# Patient Record
Sex: FEMALE | Race: BLACK OR AFRICAN AMERICAN | NOT HISPANIC OR LATINO | Employment: FULL TIME
[De-identification: names, ages, dates, MRNs, and addresses within clinical notes are randomized per-mention and may not be internally consistent; named-entity substitution may affect disease eponyms.]

---

## 2017-10-03 ENCOUNTER — HOSPITAL ENCOUNTER (OUTPATIENT)
Dept: LAB | Age: 27
Setting detail: SPECIMEN
Discharge: HOME | End: 2017-10-03

## 2017-10-03 ENCOUNTER — OFFICE VISIT - HEALTHEAST (OUTPATIENT)
Dept: FAMILY MEDICINE | Facility: CLINIC | Age: 27
End: 2017-10-03

## 2017-10-03 DIAGNOSIS — Z00.00 ROUTINE GENERAL MEDICAL EXAMINATION AT A HEALTH CARE FACILITY: ICD-10-CM

## 2017-10-03 DIAGNOSIS — N89.8 VAGINAL DISCHARGE: ICD-10-CM

## 2017-10-03 DIAGNOSIS — R35.0 URINARY FREQUENCY: ICD-10-CM

## 2017-10-03 DIAGNOSIS — Z12.4 PAPANICOLAOU SMEAR FOR CERVICAL CANCER SCREENING: ICD-10-CM

## 2017-10-03 DIAGNOSIS — N91.2 AMENORRHEA: ICD-10-CM

## 2017-10-03 DIAGNOSIS — Z62.819 HISTORY OF ABUSE IN CHILDHOOD: ICD-10-CM

## 2017-10-03 DIAGNOSIS — R10.9 ABDOMINAL PAIN: ICD-10-CM

## 2017-10-03 DIAGNOSIS — R53.83 FATIGUE: ICD-10-CM

## 2017-10-03 DIAGNOSIS — M79.10 MYALGIA: ICD-10-CM

## 2017-10-03 ASSESSMENT — MIFFLIN-ST. JEOR: SCORE: 1377.35

## 2017-10-05 LAB — ANA SER QL: 0.2 U

## 2017-10-08 ENCOUNTER — HEALTH MAINTENANCE LETTER (OUTPATIENT)
Age: 27
End: 2017-10-08

## 2017-10-11 LAB

## 2017-10-12 ENCOUNTER — OFFICE VISIT - HEALTHEAST (OUTPATIENT)
Dept: FAMILY MEDICINE | Facility: CLINIC | Age: 27
End: 2017-10-12

## 2017-10-12 ENCOUNTER — HOSPITAL ENCOUNTER (OUTPATIENT)
Dept: LAB | Age: 27
Setting detail: SPECIMEN
Discharge: HOME | End: 2017-10-12

## 2017-10-12 DIAGNOSIS — M54.2 CHRONIC NECK PAIN: ICD-10-CM

## 2017-10-12 DIAGNOSIS — N63.22 BREAST LUMP ON LEFT SIDE AT 11 O'CLOCK POSITION: ICD-10-CM

## 2017-10-12 DIAGNOSIS — G89.29 CHRONIC NECK PAIN: ICD-10-CM

## 2017-10-12 DIAGNOSIS — Z23 IMMUNIZATION DUE: ICD-10-CM

## 2017-10-12 DIAGNOSIS — M79.10 MYALGIA: ICD-10-CM

## 2017-10-12 DIAGNOSIS — R35.0 URINARY FREQUENCY: ICD-10-CM

## 2017-10-12 DIAGNOSIS — N63.11 BREAST LUMP ON RIGHT SIDE AT 10 O'CLOCK POSITION: ICD-10-CM

## 2017-10-12 DIAGNOSIS — R14.0 BLOATING: ICD-10-CM

## 2017-10-12 DIAGNOSIS — N91.2 AMENORRHEA: ICD-10-CM

## 2017-10-13 ENCOUNTER — COMMUNICATION - HEALTHEAST (OUTPATIENT)
Dept: FAMILY MEDICINE | Facility: CLINIC | Age: 27
End: 2017-10-13

## 2017-10-16 ENCOUNTER — AMBULATORY - HEALTHEAST (OUTPATIENT)
Dept: FAMILY MEDICINE | Facility: CLINIC | Age: 27
End: 2017-10-16

## 2017-10-16 DIAGNOSIS — E55.9 VITAMIN D DEFICIENCY: ICD-10-CM

## 2017-10-19 ENCOUNTER — COMMUNICATION - HEALTHEAST (OUTPATIENT)
Dept: FAMILY MEDICINE | Facility: CLINIC | Age: 27
End: 2017-10-19

## 2017-10-25 ENCOUNTER — HOSPITAL ENCOUNTER (OUTPATIENT)
Dept: LAB | Age: 27
Setting detail: SPECIMEN
Discharge: HOME | End: 2017-10-25

## 2017-10-26 ENCOUNTER — COMMUNICATION - HEALTHEAST (OUTPATIENT)
Dept: FAMILY MEDICINE | Facility: CLINIC | Age: 27
End: 2017-10-26

## 2017-11-04 ENCOUNTER — COMMUNICATION - HEALTHEAST (OUTPATIENT)
Dept: FAMILY MEDICINE | Facility: CLINIC | Age: 27
End: 2017-11-04

## 2017-11-06 ENCOUNTER — HOSPITAL ENCOUNTER (OUTPATIENT)
Dept: ULTRASOUND IMAGING | Facility: CLINIC | Age: 27
Discharge: HOME | End: 2017-11-06
Attending: FAMILY MEDICINE

## 2017-11-06 DIAGNOSIS — N63.11 BREAST LUMP ON RIGHT SIDE AT 10 O'CLOCK POSITION: ICD-10-CM

## 2017-11-06 DIAGNOSIS — N63.22 BREAST LUMP ON LEFT SIDE AT 11 O'CLOCK POSITION: ICD-10-CM

## 2017-12-07 ENCOUNTER — OFFICE VISIT - HEALTHEAST (OUTPATIENT)
Dept: FAMILY MEDICINE | Facility: CLINIC | Age: 27
End: 2017-12-07

## 2017-12-07 ENCOUNTER — RECORDS - HEALTHEAST (OUTPATIENT)
Dept: ADMINISTRATIVE | Facility: OTHER | Age: 27
End: 2017-12-07

## 2017-12-07 DIAGNOSIS — R14.0 BLOATING: ICD-10-CM

## 2017-12-07 DIAGNOSIS — G47.8 SLEEP PARALYSIS: ICD-10-CM

## 2017-12-07 DIAGNOSIS — R05.9 COUGH: ICD-10-CM

## 2017-12-07 DIAGNOSIS — G47.00 INSOMNIA: ICD-10-CM

## 2018-02-16 ENCOUNTER — OFFICE VISIT - HEALTHEAST (OUTPATIENT)
Dept: FAMILY MEDICINE | Facility: CLINIC | Age: 28
End: 2018-02-16

## 2018-02-16 DIAGNOSIS — R05.3 CHRONIC COUGH: ICD-10-CM

## 2018-02-16 DIAGNOSIS — J45.909 ASTHMA: ICD-10-CM

## 2018-02-16 ASSESSMENT — MIFFLIN-ST. JEOR: SCORE: 1368

## 2018-02-21 ENCOUNTER — OFFICE VISIT - HEALTHEAST (OUTPATIENT)
Dept: MIDWIFE SERVICES | Facility: CLINIC | Age: 28
End: 2018-02-21

## 2018-02-21 DIAGNOSIS — N89.8 VAGINAL ODOR: ICD-10-CM

## 2018-02-21 DIAGNOSIS — Z83.3 FAMILY HISTORY OF DIABETES MELLITUS IN MATERNAL GRANDMOTHER: ICD-10-CM

## 2018-02-21 DIAGNOSIS — E55.9 VITAMIN D DEFICIENCY: ICD-10-CM

## 2018-02-21 DIAGNOSIS — R35.0 URINARY FREQUENCY: ICD-10-CM

## 2018-02-21 LAB
ALBUMIN UR-MCNC: NEGATIVE MG/DL
APPEARANCE UR: CLEAR
BILIRUB UR QL STRIP: NEGATIVE
CLUE CELLS: NORMAL
COLOR UR AUTO: YELLOW
GLUCOSE UR STRIP-MCNC: NEGATIVE MG/DL
HBA1C MFR BLD: 5.8 % (ref 3.5–6)
HGB UR QL STRIP: NEGATIVE
KETONES UR STRIP-MCNC: NEGATIVE MG/DL
LEUKOCYTE ESTERASE UR QL STRIP: NEGATIVE
NITRATE UR QL: NEGATIVE
PH UR STRIP: 6.5 [PH] (ref 5–8)
SP GR UR STRIP: 1.02 (ref 1–1.03)
TRICHOMONAS, WET PREP: NORMAL
UROBILINOGEN UR STRIP-ACNC: NORMAL
YEAST, WET PREP: NORMAL

## 2018-02-21 ASSESSMENT — MIFFLIN-ST. JEOR: SCORE: 1367.71

## 2018-02-22 ENCOUNTER — COMMUNICATION - HEALTHEAST (OUTPATIENT)
Dept: ADMINISTRATIVE | Facility: CLINIC | Age: 28
End: 2018-02-22

## 2018-02-22 DIAGNOSIS — R73.03 BORDERLINE DIABETES: ICD-10-CM

## 2018-02-22 LAB
C TRACH DNA SPEC QL PROBE+SIG AMP: NEGATIVE
N GONORRHOEA DNA SPEC QL NAA+PROBE: NEGATIVE

## 2018-02-23 LAB — 25(OH)D3 SERPL-MCNC: 58.3 NG/ML (ref 30–80)

## 2018-02-28 ENCOUNTER — COMMUNICATION - HEALTHEAST (OUTPATIENT)
Dept: ADMINISTRATIVE | Facility: CLINIC | Age: 28
End: 2018-02-28

## 2018-03-02 ENCOUNTER — OFFICE VISIT - HEALTHEAST (OUTPATIENT)
Dept: FAMILY MEDICINE | Facility: CLINIC | Age: 28
End: 2018-03-02

## 2018-03-02 DIAGNOSIS — N76.0 VAGINITIS: ICD-10-CM

## 2018-03-02 DIAGNOSIS — J31.0 RHINITIS: ICD-10-CM

## 2018-03-02 DIAGNOSIS — J32.9 SINUSITIS: ICD-10-CM

## 2018-03-02 DIAGNOSIS — R05.3 CHRONIC COUGH: ICD-10-CM

## 2018-03-02 LAB
CLUE CELLS: NORMAL
TRICHOMONAS, WET PREP: NORMAL
YEAST, WET PREP: NORMAL

## 2018-03-05 ENCOUNTER — OFFICE VISIT - HEALTHEAST (OUTPATIENT)
Dept: OTOLARYNGOLOGY | Facility: CLINIC | Age: 28
End: 2018-03-05

## 2018-03-05 DIAGNOSIS — J31.0 CHRONIC RHINITIS, UNSPECIFIED TYPE: ICD-10-CM

## 2018-03-06 ENCOUNTER — OFFICE VISIT - HEALTHEAST (OUTPATIENT)
Dept: FAMILY MEDICINE | Facility: CLINIC | Age: 28
End: 2018-03-06

## 2018-03-06 DIAGNOSIS — Z30.09 FAMILY PLANNING COUNSELING: ICD-10-CM

## 2018-03-20 ENCOUNTER — COMMUNICATION - HEALTHEAST (OUTPATIENT)
Dept: SCHEDULING | Facility: CLINIC | Age: 28
End: 2018-03-20

## 2018-03-29 ENCOUNTER — COMMUNICATION - HEALTHEAST (OUTPATIENT)
Dept: FAMILY MEDICINE | Facility: CLINIC | Age: 28
End: 2018-03-29

## 2018-04-29 ENCOUNTER — COMMUNICATION - HEALTHEAST (OUTPATIENT)
Dept: FAMILY MEDICINE | Facility: CLINIC | Age: 28
End: 2018-04-29

## 2018-12-05 ENCOUNTER — HOSPITAL ENCOUNTER (OUTPATIENT)
Dept: ULTRASOUND IMAGING | Facility: CLINIC | Age: 28
Discharge: HOME | End: 2018-12-05
Attending: ADVANCED PRACTICE MIDWIFE

## 2018-12-05 ENCOUNTER — OFFICE VISIT - HEALTHEAST (OUTPATIENT)
Dept: MIDWIFE SERVICES | Facility: CLINIC | Age: 28
End: 2018-12-05

## 2018-12-05 ENCOUNTER — AMBULATORY - HEALTHEAST (OUTPATIENT)
Dept: MIDWIFE SERVICES | Facility: CLINIC | Age: 28
End: 2018-12-05

## 2018-12-05 DIAGNOSIS — Z32.00 POSSIBLE PREGNANCY: ICD-10-CM

## 2018-12-05 DIAGNOSIS — R30.0 DYSURIA: ICD-10-CM

## 2018-12-05 DIAGNOSIS — N89.8 VAGINAL DISCHARGE: ICD-10-CM

## 2018-12-05 LAB
ALBUMIN UR-MCNC: NEGATIVE MG/DL
APPEARANCE UR: CLEAR
BILIRUB UR QL STRIP: NEGATIVE
CLUE CELLS: NORMAL
COLOR UR AUTO: YELLOW
GLUCOSE UR STRIP-MCNC: NEGATIVE MG/DL
HCG UR QL: POSITIVE
HGB UR QL STRIP: NEGATIVE
KETONES UR STRIP-MCNC: NEGATIVE MG/DL
LEUKOCYTE ESTERASE UR QL STRIP: NEGATIVE
NITRATE UR QL: NEGATIVE
PH UR STRIP: 7.5 [PH] (ref 5–8)
SP GR UR STRIP: 1.02 (ref 1–1.03)
TRICHOMONAS, WET PREP: NORMAL
UROBILINOGEN UR STRIP-ACNC: NORMAL
YEAST, WET PREP: NORMAL

## 2018-12-05 ASSESSMENT — MIFFLIN-ST. JEOR: SCORE: 1406.72

## 2018-12-06 LAB
C TRACH DNA SPEC QL PROBE+SIG AMP: NEGATIVE
N GONORRHOEA DNA SPEC QL NAA+PROBE: NEGATIVE

## 2018-12-17 ENCOUNTER — HOSPITAL ENCOUNTER (OUTPATIENT)
Dept: ULTRASOUND IMAGING | Facility: HOSPITAL | Age: 28
Discharge: HOME | End: 2018-12-17
Attending: ADVANCED PRACTICE MIDWIFE

## 2018-12-17 DIAGNOSIS — Z32.00 POSSIBLE PREGNANCY: ICD-10-CM

## 2018-12-18 ENCOUNTER — COMMUNICATION - HEALTHEAST (OUTPATIENT)
Dept: MIDWIFE SERVICES | Facility: CLINIC | Age: 28
End: 2018-12-18

## 2018-12-20 ENCOUNTER — OFFICE VISIT - HEALTHEAST (OUTPATIENT)
Dept: MIDWIFE SERVICES | Facility: CLINIC | Age: 28
End: 2018-12-20

## 2018-12-20 DIAGNOSIS — Z34.91 ENCOUNTER FOR SUPERVISION OF LOW-RISK PREGNANCY IN FIRST TRIMESTER: ICD-10-CM

## 2018-12-20 DIAGNOSIS — N88.8 NABOTHIAN CYST: ICD-10-CM

## 2018-12-20 DIAGNOSIS — N83.202 CYST OF LEFT OVARY: ICD-10-CM

## 2018-12-20 DIAGNOSIS — Z67.91 RH NEGATIVE STATE IN ANTEPARTUM PERIOD, FIRST TRIMESTER: ICD-10-CM

## 2018-12-20 DIAGNOSIS — O26.891 RH NEGATIVE STATE IN ANTEPARTUM PERIOD, FIRST TRIMESTER: ICD-10-CM

## 2018-12-20 DIAGNOSIS — D25.1 INTRAMURAL LEIOMYOMA OF UTERUS: ICD-10-CM

## 2018-12-20 DIAGNOSIS — O21.9 VOMITING OR NAUSEA OF PREGNANCY: ICD-10-CM

## 2018-12-20 ASSESSMENT — MIFFLIN-ST. JEOR: SCORE: 1399.47

## 2019-01-07 ENCOUNTER — PRENATAL OFFICE VISIT - HEALTHEAST (OUTPATIENT)
Dept: MIDWIFE SERVICES | Facility: CLINIC | Age: 29
End: 2019-01-07

## 2019-01-07 DIAGNOSIS — O09.891 SUPERVISION OF OTHER HIGH RISK PREGNANCIES, FIRST TRIMESTER: ICD-10-CM

## 2019-01-07 DIAGNOSIS — N63.0 BENIGN BREAST LUMPS: ICD-10-CM

## 2019-01-07 LAB
BASOPHILS # BLD AUTO: 0 THOU/UL (ref 0–0.2)
BASOPHILS NFR BLD AUTO: 0 % (ref 0–2)
EOSINOPHIL # BLD AUTO: 0.1 THOU/UL (ref 0–0.4)
EOSINOPHIL NFR BLD AUTO: 2 % (ref 0–6)
ERYTHROCYTE [DISTWIDTH] IN BLOOD BY AUTOMATED COUNT: 12.7 % (ref 11–14.5)
HCT VFR BLD AUTO: 37.1 % (ref 35–47)
HGB BLD-MCNC: 12 G/DL (ref 12–16)
LYMPHOCYTES # BLD AUTO: 1.9 THOU/UL (ref 0.8–4.4)
LYMPHOCYTES NFR BLD AUTO: 24 % (ref 20–40)
MCH RBC QN AUTO: 25.9 PG (ref 27–34)
MCHC RBC AUTO-ENTMCNC: 32.3 G/DL (ref 32–36)
MCV RBC AUTO: 80 FL (ref 80–100)
MONOCYTES # BLD AUTO: 0.5 THOU/UL (ref 0–0.9)
MONOCYTES NFR BLD AUTO: 7 % (ref 2–10)
NEUTROPHILS # BLD AUTO: 5.1 THOU/UL (ref 2–7.7)
NEUTROPHILS NFR BLD AUTO: 67 % (ref 50–70)
PLATELET # BLD AUTO: 287 THOU/UL (ref 140–440)
PMV BLD AUTO: 10.6 FL (ref 8.5–12.5)
RBC # BLD AUTO: 4.64 MILL/UL (ref 3.8–5.4)
WBC: 7.7 THOU/UL (ref 4–11)

## 2019-01-07 ASSESSMENT — MIFFLIN-ST. JEOR: SCORE: 1381.32

## 2019-01-08 LAB
ABO/RH(D): NORMAL
ABORH REPEAT: NORMAL
ANTIBODY SCREEN: NEGATIVE
BACTERIA SPEC CULT: NO GROWTH
HIV 1+2 AB+HIV1 P24 AG SERPL QL IA: NEGATIVE

## 2019-01-09 LAB
HBV SURFACE AG SERPL QL IA: NEGATIVE
HCV AB SERPL QL IA: NEGATIVE
RUBV IGG SERPL QL IA: POSITIVE
T PALLIDUM AB SER QL: NEGATIVE

## 2019-01-23 ENCOUNTER — PRENATAL OFFICE VISIT - HEALTHEAST (OUTPATIENT)
Dept: MIDWIFE SERVICES | Facility: CLINIC | Age: 29
End: 2019-01-23

## 2019-01-23 DIAGNOSIS — O21.0 MILD HYPEREMESIS GRAVIDARUM, ANTEPARTUM: ICD-10-CM

## 2019-01-23 DIAGNOSIS — O21.9 VOMITING OR NAUSEA OF PREGNANCY: ICD-10-CM

## 2019-01-23 LAB
ALBUMIN UR-MCNC: ABNORMAL MG/DL
AMORPH CRY #/AREA URNS HPF: ABNORMAL /[HPF]
APPEARANCE UR: CLEAR
BACTERIA #/AREA URNS HPF: ABNORMAL HPF
BILIRUB UR QL STRIP: NEGATIVE
COLOR UR AUTO: YELLOW
GLUCOSE UR STRIP-MCNC: NEGATIVE MG/DL
HGB UR QL STRIP: NEGATIVE
KETONES UR STRIP-MCNC: NEGATIVE MG/DL
LEUKOCYTE ESTERASE UR QL STRIP: NEGATIVE
NITRATE UR QL: NEGATIVE
PH UR STRIP: 7.5 [PH] (ref 5–8)
RBC #/AREA URNS AUTO: ABNORMAL HPF
SP GR UR STRIP: 1.02 (ref 1–1.03)
SQUAMOUS #/AREA URNS AUTO: ABNORMAL LPF
UROBILINOGEN UR STRIP-ACNC: ABNORMAL
WBC #/AREA URNS AUTO: ABNORMAL HPF

## 2019-01-24 ENCOUNTER — AMBULATORY - HEALTHEAST (OUTPATIENT)
Dept: PHARMACY | Facility: HOSPITAL | Age: 29
End: 2019-01-24

## 2019-01-25 ENCOUNTER — COMMUNICATION - HEALTHEAST (OUTPATIENT)
Dept: SCHEDULING | Facility: CLINIC | Age: 29
End: 2019-01-25

## 2019-01-25 DIAGNOSIS — R09.82 POST-NASAL DRAINAGE: ICD-10-CM

## 2019-01-29 ENCOUNTER — COMMUNICATION - HEALTHEAST (OUTPATIENT)
Dept: FAMILY MEDICINE | Facility: CLINIC | Age: 29
End: 2019-01-29

## 2019-02-26 ENCOUNTER — INFUSION - HEALTHEAST (OUTPATIENT)
Dept: INFUSION THERAPY | Facility: HOSPITAL | Age: 29
End: 2019-02-26

## 2019-02-26 ENCOUNTER — AMBULATORY - HEALTHEAST (OUTPATIENT)
Dept: MIDWIFE SERVICES | Facility: CLINIC | Age: 29
End: 2019-02-26

## 2019-02-26 DIAGNOSIS — O21.9 VOMITING OR NAUSEA OF PREGNANCY: ICD-10-CM

## 2019-02-26 DIAGNOSIS — O21.0 MILD HYPEREMESIS GRAVIDARUM, ANTEPARTUM: ICD-10-CM

## 2019-03-07 ENCOUNTER — OFFICE VISIT - HEALTHEAST (OUTPATIENT)
Dept: FAMILY MEDICINE | Facility: CLINIC | Age: 29
End: 2019-03-07

## 2019-03-07 DIAGNOSIS — R09.81 NASAL CONGESTION: ICD-10-CM

## 2019-03-07 DIAGNOSIS — O21.0 HYPEREMESIS GRAVIDARUM, ANTEPARTUM: ICD-10-CM

## 2019-03-11 ENCOUNTER — COMMUNICATION - HEALTHEAST (OUTPATIENT)
Dept: FAMILY MEDICINE | Facility: CLINIC | Age: 29
End: 2019-03-11

## 2019-03-13 ENCOUNTER — COMMUNICATION - HEALTHEAST (OUTPATIENT)
Dept: FAMILY MEDICINE | Facility: CLINIC | Age: 29
End: 2019-03-13

## 2019-03-13 DIAGNOSIS — J31.0 CHRONIC RHINITIS: ICD-10-CM

## 2019-03-20 ENCOUNTER — COMMUNICATION - HEALTHEAST (OUTPATIENT)
Dept: FAMILY MEDICINE | Facility: CLINIC | Age: 29
End: 2019-03-20

## 2019-03-29 ENCOUNTER — PRENATAL OFFICE VISIT - HEALTHEAST (OUTPATIENT)
Dept: MIDWIFE SERVICES | Facility: CLINIC | Age: 29
End: 2019-03-29

## 2019-03-29 DIAGNOSIS — O09.891 SUPERVISION OF OTHER HIGH RISK PREGNANCIES, FIRST TRIMESTER: ICD-10-CM

## 2019-03-29 DIAGNOSIS — R73.03 BORDERLINE DIABETES: ICD-10-CM

## 2019-03-29 DIAGNOSIS — O21.9 VOMITING OR NAUSEA OF PREGNANCY: ICD-10-CM

## 2019-03-29 LAB
ALBUMIN UR-MCNC: ABNORMAL MG/DL
AMPHETAMINES UR QL SCN: NORMAL
APPEARANCE UR: CLEAR
BACTERIA #/AREA URNS HPF: ABNORMAL HPF
BARBITURATES UR QL: NORMAL
BENZODIAZ UR QL: NORMAL
BILIRUB UR QL STRIP: NEGATIVE
CANNABINOIDS UR QL SCN: NORMAL
COCAINE UR QL: NORMAL
COLOR UR AUTO: YELLOW
CREAT UR-MCNC: 114.7 MG/DL
GLUCOSE UR STRIP-MCNC: NEGATIVE MG/DL
HBA1C MFR BLD: 4.9 % (ref 3.5–6)
HGB UR QL STRIP: NEGATIVE
KETONES UR STRIP-MCNC: NEGATIVE MG/DL
LEUKOCYTE ESTERASE UR QL STRIP: ABNORMAL
METHADONE UR QL SCN: NORMAL
MUCOUS THREADS #/AREA URNS LPF: ABNORMAL LPF
NITRATE UR QL: NEGATIVE
OPIATES UR QL SCN: NORMAL
OXYCODONE UR QL: NORMAL
PCP UR QL SCN: NORMAL
PH UR STRIP: 7.5 [PH] (ref 5–8)
RBC #/AREA URNS AUTO: ABNORMAL HPF
SP GR UR STRIP: 1.02 (ref 1–1.03)
SQUAMOUS #/AREA URNS AUTO: ABNORMAL LPF
UROBILINOGEN UR STRIP-ACNC: ABNORMAL
WBC #/AREA URNS AUTO: ABNORMAL HPF

## 2019-03-29 ASSESSMENT — MIFFLIN-ST. JEOR: SCORE: 1404

## 2019-03-31 ENCOUNTER — AMBULATORY - HEALTHEAST (OUTPATIENT)
Dept: OBGYN | Facility: CLINIC | Age: 29
End: 2019-03-31

## 2019-03-31 DIAGNOSIS — R82.71: ICD-10-CM

## 2019-03-31 DIAGNOSIS — O99.891: ICD-10-CM

## 2019-04-01 ENCOUNTER — COMMUNICATION - HEALTHEAST (OUTPATIENT)
Dept: FAMILY MEDICINE | Facility: CLINIC | Age: 29
End: 2019-04-01

## 2019-04-01 ENCOUNTER — AMBULATORY - HEALTHEAST (OUTPATIENT)
Dept: MIDWIFE SERVICES | Facility: CLINIC | Age: 29
End: 2019-04-01

## 2019-04-03 ENCOUNTER — HOSPITAL ENCOUNTER (OUTPATIENT)
Dept: ULTRASOUND IMAGING | Facility: CLINIC | Age: 29
Discharge: HOME | End: 2019-04-03
Attending: ADVANCED PRACTICE MIDWIFE

## 2019-04-03 DIAGNOSIS — O09.891 SUPERVISION OF OTHER HIGH RISK PREGNANCIES, FIRST TRIMESTER: ICD-10-CM

## 2019-04-04 ENCOUNTER — AMBULATORY - HEALTHEAST (OUTPATIENT)
Dept: OBGYN | Facility: CLINIC | Age: 29
End: 2019-04-04

## 2019-04-08 ENCOUNTER — COMMUNICATION - HEALTHEAST (OUTPATIENT)
Dept: MIDWIFE SERVICES | Facility: CLINIC | Age: 29
End: 2019-04-08

## 2019-04-09 ENCOUNTER — COMMUNICATION - HEALTHEAST (OUTPATIENT)
Dept: MIDWIFE SERVICES | Facility: CLINIC | Age: 29
End: 2019-04-09

## 2019-04-09 ENCOUNTER — COMMUNICATION - HEALTHEAST (OUTPATIENT)
Dept: ADMINISTRATIVE | Facility: CLINIC | Age: 29
End: 2019-04-09

## 2019-04-10 ENCOUNTER — PRENATAL OFFICE VISIT - HEALTHEAST (OUTPATIENT)
Dept: MIDWIFE SERVICES | Facility: CLINIC | Age: 29
End: 2019-04-10

## 2019-04-10 DIAGNOSIS — Z62.819 HISTORY OF ABUSE IN CHILDHOOD: ICD-10-CM

## 2019-04-10 DIAGNOSIS — O09.891 SUPERVISION OF OTHER HIGH RISK PREGNANCIES, FIRST TRIMESTER: ICD-10-CM

## 2019-04-10 DIAGNOSIS — N89.8 VAGINAL DISCHARGE: ICD-10-CM

## 2019-04-10 DIAGNOSIS — N63.0 BENIGN BREAST LUMPS: ICD-10-CM

## 2019-04-10 DIAGNOSIS — O99.891: ICD-10-CM

## 2019-04-10 DIAGNOSIS — B37.31 CANDIDIASIS OF VULVA AND VAGINA: ICD-10-CM

## 2019-04-10 DIAGNOSIS — R82.71: ICD-10-CM

## 2019-04-10 LAB
CLUE CELLS: NORMAL
TRICHOMONAS, WET PREP: NORMAL
YEAST, WET PREP: NORMAL

## 2019-04-10 ASSESSMENT — MIFFLIN-ST. JEOR: SCORE: 1426.68

## 2019-04-11 LAB — BACTERIA SPEC CULT: NO GROWTH

## 2019-04-12 ENCOUNTER — RECORDS - HEALTHEAST (OUTPATIENT)
Dept: ADMINISTRATIVE | Facility: OTHER | Age: 29
End: 2019-04-12

## 2019-04-15 ENCOUNTER — COMMUNICATION - HEALTHEAST (OUTPATIENT)
Dept: OBGYN | Facility: CLINIC | Age: 29
End: 2019-04-15

## 2019-04-17 ENCOUNTER — COMMUNICATION - HEALTHEAST (OUTPATIENT)
Dept: MIDWIFE SERVICES | Facility: CLINIC | Age: 29
End: 2019-04-17

## 2019-04-17 ENCOUNTER — TRANSCRIBE ORDERS (OUTPATIENT)
Dept: MATERNAL FETAL MEDICINE | Facility: CLINIC | Age: 29
End: 2019-04-17

## 2019-04-17 ENCOUNTER — AMBULATORY - HEALTHEAST (OUTPATIENT)
Dept: MATERNAL FETAL MEDICINE | Facility: HOSPITAL | Age: 29
End: 2019-04-17

## 2019-04-17 ENCOUNTER — RECORDS - HEALTHEAST (OUTPATIENT)
Dept: ADMINISTRATIVE | Facility: OTHER | Age: 29
End: 2019-04-17

## 2019-04-17 ENCOUNTER — PRE VISIT (OUTPATIENT)
Dept: MATERNAL FETAL MEDICINE | Facility: CLINIC | Age: 29
End: 2019-04-17

## 2019-04-17 ENCOUNTER — COMMUNICATION - HEALTHEAST (OUTPATIENT)
Dept: MATERNAL FETAL MEDICINE | Facility: HOSPITAL | Age: 29
End: 2019-04-17

## 2019-04-17 DIAGNOSIS — O26.90 PREGNANCY RELATED CONDITION, ANTEPARTUM: Primary | ICD-10-CM

## 2019-04-17 DIAGNOSIS — O26.90 PREGNANCY, ANTEPARTUM, COMPLICATIONS: ICD-10-CM

## 2019-04-17 DIAGNOSIS — J10.1 INFLUENZA A: ICD-10-CM

## 2019-04-17 DIAGNOSIS — O34.32 CERVICAL FUNNELING AFFECTING PREGNANCY IN SECOND TRIMESTER: ICD-10-CM

## 2019-04-18 ENCOUNTER — HOSPITAL ENCOUNTER (OUTPATIENT)
Dept: ULTRASOUND IMAGING | Facility: CLINIC | Age: 29
Discharge: HOME | End: 2019-04-18
Attending: ADVANCED PRACTICE MIDWIFE | Admitting: ADVANCED PRACTICE MIDWIFE
Payer: COMMERCIAL

## 2019-04-18 ENCOUNTER — OFFICE VISIT (OUTPATIENT)
Dept: MATERNAL FETAL MEDICINE | Facility: CLINIC | Age: 29
End: 2019-04-18
Attending: ADVANCED PRACTICE MIDWIFE
Payer: COMMERCIAL

## 2019-04-18 ENCOUNTER — RECORDS - HEALTHEAST (OUTPATIENT)
Dept: ADMINISTRATIVE | Facility: OTHER | Age: 29
End: 2019-04-18

## 2019-04-18 DIAGNOSIS — O26.879 SHORT CERVIX AFFECTING PREGNANCY: Primary | ICD-10-CM

## 2019-04-18 DIAGNOSIS — O26.90 PREGNANCY RELATED CONDITION, ANTEPARTUM: ICD-10-CM

## 2019-04-18 PROCEDURE — 76817 TRANSVAGINAL US OBSTETRIC: CPT

## 2019-04-18 NOTE — PROGRESS NOTES
"Please see \"Imaging\" tab under \"Chart Review\" for details of today's US.      Sunshine Patel, DO  Maternal-Fetal Medicine        "

## 2019-04-19 ENCOUNTER — AMBULATORY - HEALTHEAST (OUTPATIENT)
Dept: MIDWIFE SERVICES | Facility: CLINIC | Age: 29
End: 2019-04-19

## 2019-04-19 DIAGNOSIS — O34.32 CERVICAL FUNNELING AFFECTING PREGNANCY IN SECOND TRIMESTER: ICD-10-CM

## 2019-04-21 ENCOUNTER — RECORDS - HEALTHEAST (OUTPATIENT)
Dept: ADMINISTRATIVE | Facility: OTHER | Age: 29
End: 2019-04-21

## 2019-04-23 ENCOUNTER — AMBULATORY - HEALTHEAST (OUTPATIENT)
Dept: MIDWIFE SERVICES | Facility: CLINIC | Age: 29
End: 2019-04-23

## 2019-04-23 ENCOUNTER — HOSPITAL ENCOUNTER (OUTPATIENT)
Dept: ULTRASOUND IMAGING | Facility: CLINIC | Age: 29
Discharge: HOME | End: 2019-04-23
Attending: OBSTETRICS & GYNECOLOGY | Admitting: SOCIAL WORKER
Payer: COMMERCIAL

## 2019-04-23 ENCOUNTER — RECORDS - HEALTHEAST (OUTPATIENT)
Dept: ADMINISTRATIVE | Facility: OTHER | Age: 29
End: 2019-04-23

## 2019-04-23 ENCOUNTER — OFFICE VISIT (OUTPATIENT)
Dept: MATERNAL FETAL MEDICINE | Facility: CLINIC | Age: 29
End: 2019-04-23
Attending: OBSTETRICS & GYNECOLOGY
Payer: COMMERCIAL

## 2019-04-23 DIAGNOSIS — O34.32 CERVICAL FUNNELING AFFECTING PREGNANCY IN SECOND TRIMESTER: ICD-10-CM

## 2019-04-23 DIAGNOSIS — O26.879 SHORT CERVIX AFFECTING PREGNANCY: Primary | ICD-10-CM

## 2019-04-23 DIAGNOSIS — O26.879 SHORT CERVIX AFFECTING PREGNANCY: ICD-10-CM

## 2019-04-23 PROCEDURE — 76817 TRANSVAGINAL US OBSTETRIC: CPT

## 2019-04-23 NOTE — PROGRESS NOTES
Please see ultrasound report under imaging tab for details on ultrasound performed today.    Kenia Vincent MD  , OB/GYN  Maternal-Fetal Medicine  [email or web address]  [phone] (Academic office)  [phone] (Pager)

## 2019-04-24 ENCOUNTER — AMBULATORY - HEALTHEAST (OUTPATIENT)
Dept: MATERNAL FETAL MEDICINE | Facility: HOSPITAL | Age: 29
End: 2019-04-24

## 2019-04-24 DIAGNOSIS — O26.90 PREGNANCY, ANTEPARTUM, COMPLICATIONS: ICD-10-CM

## 2019-04-29 ENCOUNTER — OFFICE VISIT - HEALTHEAST (OUTPATIENT)
Dept: MATERNAL FETAL MEDICINE | Facility: HOSPITAL | Age: 29
End: 2019-04-29

## 2019-04-29 ENCOUNTER — RECORDS - HEALTHEAST (OUTPATIENT)
Dept: ULTRASOUND IMAGING | Facility: HOSPITAL | Age: 29
End: 2019-04-29

## 2019-04-29 ENCOUNTER — RECORDS - HEALTHEAST (OUTPATIENT)
Dept: ADMINISTRATIVE | Facility: OTHER | Age: 29
End: 2019-04-29

## 2019-04-29 DIAGNOSIS — O35.EXX0 PREGNANCY AFFECTED BY GENITOURINARY ABNORMALITY OF FETUS, SINGLE OR UNSPECIFIED FETUS: ICD-10-CM

## 2019-04-29 DIAGNOSIS — O26.90 PREGNANCY RELATED CONDITIONS, UNSPECIFIED, UNSPECIFIED TRIMESTER: ICD-10-CM

## 2019-04-30 ENCOUNTER — AMBULATORY - HEALTHEAST (OUTPATIENT)
Dept: MIDWIFE SERVICES | Facility: CLINIC | Age: 29
End: 2019-04-30

## 2019-05-07 ENCOUNTER — COMMUNICATION - HEALTHEAST (OUTPATIENT)
Dept: MIDWIFE SERVICES | Facility: CLINIC | Age: 29
End: 2019-05-07

## 2019-05-07 ENCOUNTER — OFFICE VISIT - HEALTHEAST (OUTPATIENT)
Dept: FAMILY MEDICINE | Facility: CLINIC | Age: 29
End: 2019-05-07

## 2019-05-07 DIAGNOSIS — N89.8 VAGINAL DISCHARGE: ICD-10-CM

## 2019-05-07 DIAGNOSIS — B37.31 YEAST VAGINITIS: ICD-10-CM

## 2019-05-07 LAB
CLUE CELLS: ABNORMAL
TRICHOMONAS, WET PREP: ABNORMAL
YEAST, WET PREP: ABNORMAL

## 2019-05-07 ASSESSMENT — MIFFLIN-ST. JEOR: SCORE: 1420.78

## 2019-05-08 ENCOUNTER — COMMUNICATION - HEALTHEAST (OUTPATIENT)
Dept: MIDWIFE SERVICES | Facility: CLINIC | Age: 29
End: 2019-05-08

## 2019-06-22 ENCOUNTER — RECORDS - HEALTHEAST (OUTPATIENT)
Dept: ADMINISTRATIVE | Facility: OTHER | Age: 29
End: 2019-06-22

## 2019-06-28 ENCOUNTER — COMMUNICATION - HEALTHEAST (OUTPATIENT)
Dept: ADMINISTRATIVE | Facility: CLINIC | Age: 29
End: 2019-06-28

## 2019-07-01 ENCOUNTER — PRENATAL OFFICE VISIT - HEALTHEAST (OUTPATIENT)
Dept: MIDWIFE SERVICES | Facility: CLINIC | Age: 29
End: 2019-07-01

## 2019-07-01 DIAGNOSIS — O09.30 LATE PRENATAL CARE COMPLICATING PREGNANCY, UNSPECIFIED TRIMESTER: ICD-10-CM

## 2019-07-01 DIAGNOSIS — O26.891 RH NEGATIVE STATE IN ANTEPARTUM PERIOD, FIRST TRIMESTER: ICD-10-CM

## 2019-07-01 DIAGNOSIS — O35.EXX0 PYELECTASIS OF FETUS ON PRENATAL ULTRASOUND: ICD-10-CM

## 2019-07-01 DIAGNOSIS — O09.891 SUPERVISION OF OTHER HIGH RISK PREGNANCIES, FIRST TRIMESTER: ICD-10-CM

## 2019-07-01 DIAGNOSIS — Z67.91 RH NEGATIVE STATE IN ANTEPARTUM PERIOD, FIRST TRIMESTER: ICD-10-CM

## 2019-07-01 DIAGNOSIS — O99.013 ANEMIA DURING PREGNANCY IN THIRD TRIMESTER: ICD-10-CM

## 2019-07-01 DIAGNOSIS — O09.30 LIMITED PRENATAL CARE, ANTEPARTUM: ICD-10-CM

## 2019-07-01 LAB
AMPHETAMINES UR QL SCN: NORMAL
BARBITURATES UR QL: NORMAL
BENZODIAZ UR QL: NORMAL
CANNABINOIDS UR QL SCN: NORMAL
COCAINE UR QL: NORMAL
CREAT UR-MCNC: 161.9 MG/DL
FASTING STATUS PATIENT QL REPORTED: NO
GLUCOSE 1H P 50 G GLC PO SERPL-MCNC: 93 MG/DL (ref 70–139)
HGB BLD-MCNC: 9.5 G/DL (ref 12–16)
METHADONE UR QL SCN: NORMAL
OPIATES UR QL SCN: NORMAL
OXYCODONE UR QL: NORMAL
PCP UR QL SCN: NORMAL

## 2019-07-01 ASSESSMENT — MIFFLIN-ST. JEOR: SCORE: 1449.36

## 2019-07-02 LAB
ANTIBODY SCREEN: NEGATIVE
HCV AB SERPL QL IA: NEGATIVE
T PALLIDUM AB SER QL: NEGATIVE

## 2019-07-06 ENCOUNTER — COMMUNICATION - HEALTHEAST (OUTPATIENT)
Dept: SCHEDULING | Facility: CLINIC | Age: 29
End: 2019-07-06

## 2019-07-06 ENCOUNTER — RECORDS - HEALTHEAST (OUTPATIENT)
Dept: SCHEDULING | Facility: CLINIC | Age: 29
End: 2019-07-06

## 2019-07-11 ENCOUNTER — COMMUNICATION - HEALTHEAST (OUTPATIENT)
Dept: FAMILY MEDICINE | Facility: CLINIC | Age: 29
End: 2019-07-11

## 2019-07-12 ENCOUNTER — COMMUNICATION - HEALTHEAST (OUTPATIENT)
Dept: MIDWIFE SERVICES | Facility: CLINIC | Age: 29
End: 2019-07-12

## 2019-07-15 ENCOUNTER — AMBULATORY - HEALTHEAST (OUTPATIENT)
Dept: MATERNAL FETAL MEDICINE | Facility: HOSPITAL | Age: 29
End: 2019-07-15

## 2019-07-17 ENCOUNTER — PRENATAL OFFICE VISIT - HEALTHEAST (OUTPATIENT)
Dept: MIDWIFE SERVICES | Facility: CLINIC | Age: 29
End: 2019-07-17

## 2019-07-17 DIAGNOSIS — O99.013 ANEMIA DURING PREGNANCY IN THIRD TRIMESTER: ICD-10-CM

## 2019-07-17 DIAGNOSIS — Z20.2 POSSIBLE EXPOSURE TO STD: ICD-10-CM

## 2019-07-17 DIAGNOSIS — O09.893 SUPERVISION OF OTHER HIGH RISK PREGNANCIES, THIRD TRIMESTER: ICD-10-CM

## 2019-07-17 DIAGNOSIS — O35.EXX0 PYELECTASIS OF FETUS ON PRENATAL ULTRASOUND: ICD-10-CM

## 2019-07-17 DIAGNOSIS — Z65.9 OTHER SOCIAL STRESSOR: ICD-10-CM

## 2019-07-17 LAB
CLUE CELLS: ABNORMAL
HIV 1+2 AB+HIV1 P24 AG SERPL QL IA: NEGATIVE
TRICHOMONAS, WET PREP: ABNORMAL
YEAST, WET PREP: ABNORMAL

## 2019-07-17 RX ORDER — FERROUS SULFATE 325(65) MG
1 TABLET ORAL 2 TIMES DAILY WITH MEALS
Qty: 90 TABLET | Refills: 0 | Status: SHIPPED | OUTPATIENT
Start: 2019-07-17

## 2019-07-18 ENCOUNTER — AMBULATORY - HEALTHEAST (OUTPATIENT)
Dept: MATERNAL FETAL MEDICINE | Facility: HOSPITAL | Age: 29
End: 2019-07-18

## 2019-07-18 DIAGNOSIS — O26.90 PREGNANCY, ANTEPARTUM, COMPLICATIONS: ICD-10-CM

## 2019-07-18 LAB
C TRACH DNA SPEC QL PROBE+SIG AMP: NEGATIVE
HBV SURFACE AG SERPL QL IA: NEGATIVE
HCV AB SERPL QL IA: NEGATIVE
N GONORRHOEA DNA SPEC QL NAA+PROBE: NEGATIVE
T PALLIDUM AB SER QL: NEGATIVE

## 2019-07-19 ENCOUNTER — RECORDS - HEALTHEAST (OUTPATIENT)
Dept: ADMINISTRATIVE | Facility: OTHER | Age: 29
End: 2019-07-19

## 2019-07-19 ENCOUNTER — RECORDS - HEALTHEAST (OUTPATIENT)
Dept: ULTRASOUND IMAGING | Facility: HOSPITAL | Age: 29
End: 2019-07-19

## 2019-07-19 ENCOUNTER — OFFICE VISIT - HEALTHEAST (OUTPATIENT)
Dept: MATERNAL FETAL MEDICINE | Facility: HOSPITAL | Age: 29
End: 2019-07-19

## 2019-07-19 ENCOUNTER — AMBULATORY - HEALTHEAST (OUTPATIENT)
Dept: MIDWIFE SERVICES | Facility: CLINIC | Age: 29
End: 2019-07-19

## 2019-07-19 DIAGNOSIS — O35.9XX0 FETAL ABNORMALITY AFFECTING MANAGEMENT OF MOTHER, ANTEPARTUM, SINGLE OR UNSPECIFIED FETUS: ICD-10-CM

## 2019-07-19 DIAGNOSIS — O26.90 PREGNANCY RELATED CONDITIONS, UNSPECIFIED, UNSPECIFIED TRIMESTER: ICD-10-CM

## 2019-07-19 DIAGNOSIS — O28.3 ABNORMAL FETAL ULTRASOUND: ICD-10-CM

## 2019-07-19 LAB
ALLERGIC TO PENICILLIN: NO
GP B STREP DNA SPEC QL NAA+PROBE: NEGATIVE

## 2019-07-23 ENCOUNTER — COMMUNICATION - HEALTHEAST (OUTPATIENT)
Dept: OBGYN | Facility: HOSPITAL | Age: 29
End: 2019-07-23

## 2019-07-23 ENCOUNTER — COMMUNICATION - HEALTHEAST (OUTPATIENT)
Dept: MIDWIFE SERVICES | Facility: CLINIC | Age: 29
End: 2019-07-23

## 2019-07-25 ENCOUNTER — COMMUNICATION - HEALTHEAST (OUTPATIENT)
Dept: FAMILY MEDICINE | Facility: CLINIC | Age: 29
End: 2019-07-25

## 2019-07-30 ENCOUNTER — COMMUNICATION - HEALTHEAST (OUTPATIENT)
Dept: MIDWIFE SERVICES | Facility: CLINIC | Age: 29
End: 2019-07-30

## 2019-08-22 ENCOUNTER — COMMUNICATION - HEALTHEAST (OUTPATIENT)
Dept: MIDWIFE SERVICES | Facility: CLINIC | Age: 29
End: 2019-08-22

## 2020-02-22 ENCOUNTER — COMMUNICATION - HEALTHEAST (OUTPATIENT)
Dept: FAMILY MEDICINE | Facility: CLINIC | Age: 30
End: 2020-02-22

## 2021-03-06 ENCOUNTER — OFFICE VISIT - HEALTHEAST (OUTPATIENT)
Dept: FAMILY MEDICINE | Facility: CLINIC | Age: 31
End: 2021-03-06

## 2021-03-06 DIAGNOSIS — B96.89 BV (BACTERIAL VAGINOSIS): ICD-10-CM

## 2021-03-06 DIAGNOSIS — N76.0 BV (BACTERIAL VAGINOSIS): ICD-10-CM

## 2021-03-06 DIAGNOSIS — N89.8 VAGINAL DISCHARGE: ICD-10-CM

## 2021-03-06 LAB
ALBUMIN UR-MCNC: NEGATIVE MG/DL
APPEARANCE UR: CLEAR
BACTERIA #/AREA URNS HPF: ABNORMAL HPF
BILIRUB UR QL STRIP: NEGATIVE
CLUE CELLS: ABNORMAL
COLOR UR AUTO: YELLOW
GLUCOSE UR STRIP-MCNC: NEGATIVE MG/DL
HGB UR QL STRIP: ABNORMAL
KETONES UR STRIP-MCNC: NEGATIVE MG/DL
LEUKOCYTE ESTERASE UR QL STRIP: NEGATIVE
NITRATE UR QL: NEGATIVE
PH UR STRIP: 5.5 [PH] (ref 5–8)
RBC #/AREA URNS AUTO: ABNORMAL HPF
SP GR UR STRIP: >=1.03 (ref 1–1.03)
SQUAMOUS #/AREA URNS AUTO: ABNORMAL LPF
TRICHOMONAS, WET PREP: ABNORMAL
UROBILINOGEN UR STRIP-ACNC: ABNORMAL
WBC #/AREA URNS AUTO: ABNORMAL HPF
YEAST, WET PREP: ABNORMAL

## 2021-05-27 NOTE — PATIENT INSTRUCTIONS - HE
Generally fragrance free soaps, detergents is best    Products that tend to be safe are Cetaphil, Dove unscented, Aveeno Oatmeal    Aquaphor Eucerin Ointment to seal in moisture after bathing and on a rash.    Recommendations to reduce Nausea of pregnancy:    -small frequent meals, focus on protein 80-100g/day. Avoid heavy, greasy meals  -fresh air/exercise more days out of the week than not, 30 min  -Nadia or peppermint tea, lozenges, gum  -Seabands  -vitamin B6 50mg three times a day, with 50mg of Unisom at night (unisom makes you sleepy)      It looks like you have a vaginal yeast infection   Try Clotrimazole #7 or Monistat #7, insert nightly for 7 nights. You can apply cream to the area that is irritating on your vulva as well.     Call us if your symptoms don't resolve.     Cecelia to meet you today!  CLARITZA Donnelly,WESM

## 2021-05-27 NOTE — TELEPHONE ENCOUNTER
Paperwork from the [employer] came via fax today    Need to ask patient if this is meant for when she is post partum leave or if it was meant for her nausea and vomiting during pregnancy    Left message to return call
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
## 2021-05-27 NOTE — TELEPHONE ENCOUNTER
Called Sarah again regarding need for urine culture, as UA from last visit showed leukocytes and bacteria.  Had called pt after initial result, sent MyChart note and ordered culture, but pt did not return for lab visit.  No answer today--left message stating that call is to discuss need for lab visit to recheck urine.  Also stated that I noticed she is calling regarding work leave, and we would need to discuss this as well. Will send MyChart note.

## 2021-05-27 NOTE — PROGRESS NOTES
"Sarah is here for prenatal visit on her own today.  She states she has not been in a for visits because she had been feeling sick, has been busy with work, and had been seeing other providers.  She understands she needs to come for visits regularly from this point on and agrees to do so.  She is feeling that nausea is not as bad as it was;  still vomits about twice daily, usually in the morning, but that is much improved.  Still sensitive to smells--explained that is common and often continues throughout pregnancy.   Is not eating meat, but eats mashed potatoes, vegetables, fish occasionally.  Tries to \"make herself\" drink fluids.  UA done for ketones, and also added UTox with Sarah's permission due to long lapse in care.  Does note that at some times she has jitteriness, rapid heartbeat--discussed hypoglycemia and importance of trying to eat regular, small meals containing protein.  No chest pain or shortness of breath. Has restarted taking prenatal vitamins--takes gummies.  Baby is moving, no bleeding or leaking.  States she thinks she has felt movement since about six weeks pregnant, but only feeling \"kicks\" for last 3 weeks. Discussed that this is normal timing for quickening.  Reviewed physiology of second trimester and normal sensations, including round ligament pains and occasional dizziness.  Reviewed warning signs PTL.  Does have occasional crampiness and round ligament pain.  Reviewed history:  Oma had diabetes and she was told at one time that she might be \"prediabetic:\"  Accepts offer of Hgb A1C today.  Reviewed breast ultrasound results--has been followed for benign breast masses, likely fibroadenomas, followed with yearly ultrasounds. States is due for next one \"soon\"--last ultrasound visible in chart appears to be from 11/17.  Could consider repeating--please discuss next visit.  Also discussed NIPT and carrier testing and she desires these as well--drawn today.  Ultrasound ordered, will schedule at " [facility].  Given new copy of IOB folder and info on childbirth ed options.  Reinforced need to revisit in four weeks and discussed schedule of follow up visits throughout pregnancy.

## 2021-05-27 NOTE — TELEPHONE ENCOUNTER
"Phone call to the [employer] and spoke with a person named Ericka  Informed Uma that a form was filled out and faxed back on 1-10-19  And that the patient claims 2 questions were not answered, so asked which questions needed to be answered. Uma stated that the forms were incomplete because of Part B, questions #3 and #7.    Question #3's answer was handwritten on the side. Uma stated that the numbers actually need to be filled in. For example if she is seen 1 time a month till a certain month. And to fill in the duration of the appointment in minutes and how long it would take to travel back and forth to appointments  .    Then when the appointments become more frequent will need to update the [facility] and say that she will be coming for 2 times a month till whatever month.    And then will need to update them again when it will be 1 time a week till delivery.    Question #7 is in regards to her \"flare ups\" such as nausea and vomiting. For example how many days a month to be off work for nausea and vomiting. Uma asked if it would be 4 times a month or 5 times a month, for example.    She said can fill out new form. Or can write in changes on the old form. Date and Initial questions #3 and 37 3 and  Sign and date form and send back.    Will need to call patient to see what is the frequency of her calling in to work.    "

## 2021-05-27 NOTE — TELEPHONE ENCOUNTER
Referral from Judith Campbell to [facility] for cervical funneling. Patient will have an U/S at [facility] on 4/18. Removing order.    Heidi Santo

## 2021-05-27 NOTE — TELEPHONE ENCOUNTER
Spoke to patient    Question #3. She says she lives in [place].  It takes about 48 minutes to get to her appointment at [facility].The prenatal appointment takes about 20 minutes. Then it would take her about 48 minutes to get back home.    Question #7.  Patient states she calls in about 3 times a week because of her nausea and vomiting.  The nausea comes and goes. Sometimes she'll have it in the morning. Go waya and then come back in the afternoon.  When asked what she is doing for her nausea and vomiting. She said she is taking the vit B-6 and her {NV. She states she is trying to exercise. Trying to eat well. Eats cereal bars, mashed potatoes, drinks milk.  Has post nasal drip with her pregnancy and that makes her nauseated as well.

## 2021-05-27 NOTE — TELEPHONE ENCOUNTER
Spoke with patient  Informed that spoke with representative at [employer]  They now know of her prenatal visit schedule  They were informed that she is not considered disabled and will not momo leave because of that    Patient states she has been to the ER, was told to get IV infusion, has her visits documented and wants to know why she can't have the midwives give intermitttent leave for her nausea and vomiting. Informed that Suzie Hernandez had sent her a [employer] message explaining that the midwives cannot put her on leave since she is not disabled.    She would like to speak to a midwife.

## 2021-05-27 NOTE — PATIENT INSTRUCTIONS - HE
Visit actually [facility] Nurse Midwives - Contact information:  Appointment line and to get a hold of CNM in clinic Monday-Friday 8 am - 5 pm:  [phone].  There are some clinics with early start times (1st appointment 7:40 am) and others with evening hours (last appointment 6:20 pm).  Most are typically open from 8 am to 5 pm.    CNM on call answering service: [phone].  Specify your hospital of choice and leave a brief message for CNM;  will then page CNM who is on call at your specified hospital and you should receive a call back with 15 minutes.  Be sure that your ringer is audible and that you can accept blocked calls so that we can get back in touch with you! This number should be reserved for urgent needs if during the day, before 8 am, after 5 pm, weekends, holidays.    Contact the on-call CNM with warning signs, such as:    vaginal bleeding     Vaginal discharge and itching or pain and burning during urination    Leg/calf pain or swelling on one side    severe abdominal pain    nausea and vomiting more than 4-5 times a day, or if you are unable to keep anything down    fever more than 100.4 degrees F.         Touring the Maternity Care Center  To schedule a tour of [facility], call [phone]    To schedule a tour of [facility], call [phone]         You are invited to  Meet the [facility] Nurse-Midwives  A way to tour the hospital Labor and Delivery unit and meet the midwives that attend births since you may not have the opportunity to meet them during your prenatal care.  Some sessions are informal meet and greet type social hours, others address a specific concern or topic.    Tuesday, Februrary 12, 2019 7-8pm  [facility]    Wednesday, April 17, 2019 7-8pm  [facility], Auditorium A    Thursday, August 15, 2019 7-8pm  [facility]    Wednesday November 13, 2019 7-8 pm  [facility]  [place], Auditorum A    Please call [phone] to register      Ultrasound Appointment:   Don't forget to schedule your ultrasound appointment around 20 weeks into your pregnancy. Your midwife will order the exam for you to schedule at [phone] with [facility] radiology locations or at the independent radiology clinic of your preference.      GENETIC SCREENING OPTIONS AT [facility]          All testing is optional. We don t recommend or discourage any test; it is totally up to you and your partner. Some couples wish to know their risk of having a baby with a genetic defect and others do not. We will support your decision. Abnormal results may lead to a discussion of options for further testing.    Accurate dating of your pregnancy is important for all testing so an ultrasound may be done prior to referral or testing.    No testing provides certainty; there are false positives and negatives associated with all testing, some more than others.    Most genetic testing is non-invasive (requires only a blood sample and sometimes an ultrasound or both).    It is always wise to check with your insurance carrier before proceeding.    Some testing can be done at our lab and some require a referral.    If you decide to do no testing, the 20 week ultrasound scan has some ability to detect abnormalities in the baby.    [place] or [facility]    At 10 wk or greater, a blood sample can be drawn here at clinic or at any of our referral offices. It will provide highly accurate results with low false positive rates for trisomy 18, trisomy 21 (Down Syndrome), and trisomy 13 (> 99% trisomy detection rate at a false positive rate of <0.1%). Gender identification can also be obtained if desired.    Quad Screen (4-marker screen)    Between 15 and 21 weeks, a sample of blood can be drawn at our lab to assess your risk of having a baby with Down Syndrome, Trisomy 18 and neural tube defects. Such testing is able to detect these conditions in  80-90% of cases and the false-positive rate is approximately 5%.     Why is dental care in pregnancy important?  During pregnancy, you are more likely to have problems with your teeth or gums. If you have an infection in your teeth or gums, the chance of your baby being premature (born early) or having low birth weight may be slightly higher than if your teeth and gums are healthy  Dental care is safe during pregnancy and important for the health of you and your baby.   What should you know before you see the dentist?    Make sure your dentist knows that you are pregnant.    If medications for infection or for pain are needed, your dentist can prescribe ones that are safe for you and your baby.    Tell your dentist about any changes you have noticed since you became pregnant and about any medications r or supplements you are taking.    Routine x-rays should be avoided in pregnancy, but it may be necessary if there is a problem or an emergency.     Your body should be covered with a lead apron to protect you and your baby.    Dental work can be done safely at any point in pregnancy. If possible, it is best to delay treatments and pro- cedures until after the first trimester.    For more information on dental health in pregnancy: http://onlinelibrary.kelsey.com/store/10.1111/jmwh.52141/asset/spab75388.pdf?v=1&t=olgr0g53&u=[email or web address]     Quickening:   Your Baby in the Second Trimester of Pregnancy  ? At the start of the second trimester, you will feel your baby's movements, which get stronger as the baby grows bigger. At the end of the fourth month, your baby weighs about five ounces. Her kidneys begin to produce urine. During visits to your health care provider, you will be able to hear your baby's heartbeat more clearly. Your baby can move and hear your voice.   ? By the end of the fifth month, you'll be able to feel light movements (called quickening) of your fetus. Your baby is sleeping  and waking at regular intervals, and is more active than before. At this point, she is about nine inches long and weighs about one-half to one pound. During the sixth month, your baby's features become clearer. Eyebrows, eyelashes, and hair are developing. She also has finger and toe prints, and may be kicking strongly.  ? By the end of the second trimester, your baby weighs as much as two pounds and is about 11 inches long.         Gestational diabetes  Gestational diabetes develops during pregnancy (gestation). Like other types of diabetes, gestational diabetes affects how your cells use sugar (glucose). Gestational diabetes causes high blood sugar that can affect your pregnancy and your baby's health.  Any pregnancy complication is concerning, but there's good news. Expectant moms can help control gestational diabetes by eating healthy foods, exercising and, if necessary, taking medication. Controlling blood sugar can prevent a difficult birth and keep you and your baby healthy.  In gestational diabetes, blood sugar usually returns to normal soon after delivery. But if you've had gestational diabetes, you're at risk for type 2 diabetes. You'll continue working with your health care team to monitor and manage your blood sugar.    Who is at risk?  This is a list of factors that increase the risk of developing gestational diabetes for women during pregnancy:        Overweight prior to pregnancy (20% or more over ideal body weight)        High risk ethnic group: , , ,         Impaired glucose tolerance or traces of glucose in the urine        Family history of diabetes        Previously giving birth to a baby over 9 lbs. or stillborn        Previous pregnancy with gestational diabetes    Prevention:  There are no guarantees when it comes to preventing gestational diabetes -- but the more healthy habits you can adopt before pregnancy, the better. If you've had gestational  diabetes, these healthy choices may also reduce your risk of having it in future pregnancies or developing type 2 diabetes down the road.  Eat healthy foods. Choose foods high in fiber and low in fat and calories. Focus on fruits, vegetables and whole grains. Strive for variety to help you achieve your goals without compromising taste or nutrition. Watch portion sizes.   Keep active. Exercising before and during pregnancy can help protect you from developing gestational diabetes. Aim for 30 minutes of moderate activity on most days of the week. Take a brisk daily walk. Ride your bike. Swim laps.  If you can't fit a single 30-minute workout into your day, several shorter sessions can do just as much good. Park in the distant lot when you run errands. Get off the bus one stop before you reach your destination. Every step you take increases your chances of staying healthy.  Lose excess pounds before pregnancy. Doctors don't recommend weight loss during pregnancy. But if you're planning to get pregnant, losing extra weight beforehand may help you have a healthier pregnancy.  Focus on permanent changes to your eating habits. Motivate yourself by remembering the long-term benefits of losing weight, such as a healthier heart, more energy and improved self-esteem.    Preventing Diabetes after Pregnancy:  It is estimated that 35-60 percent of women that have had gestational diabetes will develop type 2 diabetes in the future. It is also thought that children from these pregnancies have a greater chance of developing obesity and type 2 diabetes.    If you do have prediabetes or have risk factors for having diabetes, research shows that doing just two things can help you prevent or delay type 2 diabetes: Lose 5% to 7% of your body weight, which would be 10 to 14 pounds for a 200-pound person; and get at least 150 minutes each week of physical activity, such as brisk walking.    RESOURCES   You can refer to the Starting Out  Right book or find it online at http://www.[facility].org/images/stories/maternity/[facility]-Starting-Out-Right.pdf or http://www.[facility].org/images/stories/flipbooks/[facility]-starting-out-right/[facility]-starting-out-right.html#p=8    You can sign up for a weekly parenting e-mail that gives support, tips and advice from health care professionals that starts with pregnancy and continues through the toddler years. To register, go to [email or web address] at any time during your pregnancy.    Breastfeeding Information:  OUTPATIENT LACTATION RESOURCES    -Schedule a clinic appointment with a [facility] CN with dedicated clinic hours for breastfeeding assistance by calling [phone]. Breastfeeding clinic visits are at [facility] on Wednesdays, [facility] on Tuesdays and [facility] on Thursdays.     -Schedule an outpatient appointment with one of the [facility] Lactation Consultants at [facility], [facility], or [facility] by calling [phone]    -Attend a baby weigh in at [facility].  Lactation consultants are available to answer questions  [place]: Tuesdays 1:00 - 2:00  [facility], [facility]: Mondays 1:00 - 2:00   www.[employer]    -Attend one of the New Mama groups at [facility] in [facility].  [facility] also offers one-on-one in home and in office lactation consults.   www.MyCarGossip    -Attend a LeLeche League meeting.  Multiple groups in several locations throughout the [facility]. The meetings are no-cost and always informative breastfeeding education session through [employer] La Leche League  Www.lllofmndas.org/  Medication use while breastfeeding: http://toxnet.nlm.nih.gov/newtoxnet/lactmed.htm     Childbirth and Parenting Education:   [facility]: http://[employer]/   (990) 388-BABY  [place]: (education, yoga & wellness) www.[employer]ends  [facility]: www.[employer]   Childbirth collective: (Parent  topic nights)  www.childbirthcollective.org/  Hypnobabies:  www.hypnobabiestwincities.com/  Hypnobirthing:  Http://hypnobirthing.com/    Book Recommendations:   Karen Tomas's Birthing From Within--first few chapters include a new-age tone, you may prefer to skip it and keep going, because there is good stuff later.  This book recommendation covers emotional preparation, but does cover coping with pain, and use of both pharmacological and nonpharmacological methods.    Dr. Guerrero' The Pregnancy Book and The Birth Book--the pregnancy book goes month-by month    Womanly Art of Breastfeeding by La Leche League International   Bestfeeding by Mavis Guajardo--great pictures    Mothering Your Nursing Toddler, by Yvonne Vivar.   Addresses dealing with so many of the challenging behaviors of a nursing toddler.  How Weaning Happens, by La Leche League.  Discusses weaning at all ages, from medically necessary weaning of an infant, all the way up to age 5 (or older), with why/why not, and strategies.  Very empowering book both for deciding to wean and deciding not to.    American College of Nurse-Midwives (ACNM) http://www.midwife.org/; look at the informational handouts at http://www.midwife.org/Share-With-Women     www.mymidwife.org    Mother to Baby (Medication and Herbal guidance in pregnancy): http://www.mothertobaby.org  Toll-Free Hotline: 296.652.8329  LactMed (Medication use while breastfeeding): http://toxnet.nlm.nih.gov/newtoxnet/lactmed.htm    Women's Health.gov:  http://www.womenshealth.gov/a-z-topics/index.html    American pregnancy association - http://americanpregnancy.org    Centering Pregnancy (group prenatal care option): http://centeringhealthcare.org    Information about doulas:  Childbirth collective: http://www.childbirthcollective.org/  Doulas of [place] (TATIANA):  www.tatiana.org  [facility]  project: http://twincitiesdoulaproject.com/     Early Childhood and Family Education (ECFE):  ECFE  "offers parents hands-on learning experiences that will nourish a lifetime of teachable moments.  http://ecfe.info/ecfe-home/    March of Dimes www.Conferize.GoodyTag     FDA - Nutrition  www.mypyramid.gov  Under \"For Consumers,\" click on \"pregnant and breastfeeding women.\"      Centers for Disease Control and Prevention (CDC) - Vaccines : http://www.cdc.gov/vaccines/       When researching information on the web, question the validity of websites.  The [employer] .gov, .edu and.org tend to be more reliable information.  If there are a lot of advertisements, be cautious of the information provided. Stay away from blogs and chat rooms please!  "

## 2021-05-27 NOTE — TELEPHONE ENCOUNTER
TC: Called Sarah to discuss her visit to the [facility] today.   Sarah is a 27 yo  at 23/3 weeks who presented to [facility] with LLQ pain, myalgias, cough and chest pain. She was diagnosed with Influenza A and was given Tamiflu. Her urine culture did show some bacteria, urine  Culture is pending.   Sarah had some mild cramping for three hours last night from [id number]. She had an ultrasound in the ER which showed cervical funneling. They measured the cervix to be 1.5cm and 1.2cm with valsalva     Sarah was seen last week with the  Midwives and had a negative urine culture and a vaginal yeast infection. She has been using Monistat vaginal cream for the last week, tonight is her last night to use the cream.   She had a negative urine culture last week.     Sarah was advised to schedule a f/u appt with her OB provider in the next week.   She denies pelvic pain, cramping, increased vaginal pressure, LOF or bleeding. Discussed that the flu is highly contagious and advised that she stay home until she has been on the Tamiflu for at least 24 hours, or until she is feeling better. Sarah reports she has not had a fever.     Advised that she see MFM to evaluate her cervix and devise a plan going forward. Advised that she abstain from sexual activity (intercourse or breast stimulation) until we get more information.     Will place a referral for her to see MFM soon and ask that she schedule a follow up appt with us after she has seen MFM.     Reviewed strategies to minimize her flu symptoms: encouraged rest, hydration, Robitussin, Mucinex, Tylenol as needed. Encouraged her to call with any s/sx of PTL.     Reviewed this plan with Marilu Hurtado CNM, clinical director    CLARITZA Donnelly CNM

## 2021-05-27 NOTE — TELEPHONE ENCOUNTER
Task set up to fax over Innatal and Preparent results to [facility] when they are scanned into Epic.

## 2021-05-27 NOTE — TELEPHONE ENCOUNTER
Spoke with Mike from the [facility]    Question #3. Mike said that he already has the information needed to answer question #3 from a fax received late January. He said it's already been addressed.    Question #7. Informed Mike that the patient is not disabled. CNMs will not fill out question #7 to momo leave because she is not disabled  He said he will take note of that.  The midwives do not need to fill out another FMLA form since he has been verbally informed.

## 2021-05-27 NOTE — TELEPHONE ENCOUNTER
INNATAL results (per Maansa Jasso LPN):   NEGATIVE/NORMAL, XY.    Carrier screen: POSITIVE for alpha thalassemia carrier, otherwise negative.    Left message to call back in order to discuss results over the telephone.    Plan to recommend referral to [facility] for genetic counseling and to offer the same carrier screening for Letty, her partner/FOB.

## 2021-05-27 NOTE — TELEPHONE ENCOUNTER
"Returned Sarah's call.  She reports that she is requesting \"intermittent leave\" so that she can take days off from work as often as she needs them for nausea and vomiting, without putting her job at risk.  She reports that she sometimes takes up to three days off each week, because she feels nauseated at work at different times each day, and also has postnasal drip that causes her nausea.  She states that \"intermittent leave\" is different from FMLA and disability;  She is not asking that we verify that she is disabled, just that we report to the [facility] that she will require up to three days off each week because of her nausea of pregnancy, which she can use whenever it is required. \"I know I don't need to be on bedrest or anything.\"  Explained to Sarah that we cannot sign a form confirming that she requires up to three days/week off of work, which is most of her work time, especially when she has not been willing to attempt most remedies to manage nausea.  Sarah responded that she is taking Vitamin B6 and prenatal vitamins, but that she is not willing to try Zofran or Reglan because she fears they will hurt her baby.  Reviewed that risks are very low, and that this has previously been discussed with her by both Leslee Prieto CNM, and Dr. France.  Sarah became very angry, and indicated that she feels refusal to sign medical leave form is an attempt to \"force\" her into taking medications she believes are unsafe.  Assured Sarah she is under no requirement to take medications she does not want to take, but if it is her choice to do something that affects her ability to work, she may need to manage the consequences;  The midwives cannot take the responsibility of saying she is unable to work.  Sarah stated that her pregnancy is planned, she is trying, and she does not want to just not go to work, but reiterated that she feels \"forced\" to take medications by refusal to sign medical leave form, and after a " second attempt to assure her of safety, she abruptly ended the call.     Also of note:  At last prenatal visit with this CNM, Sarah was noted to have started gaining weight, had no evidence of dehydration (no ketones in urine), and stated that nausea and vomiting, while still present, had improved.

## 2021-05-27 NOTE — TELEPHONE ENCOUNTER
Name of caller: Patient  Phone number where you may be reached: [phone]  Reason for call: Pt faxed FMLA paperwork to [place] and is checking on the status to see when it will be faxed back. Please call pt when/if paperwork is faxed.  Best time to call back: any  If we don't reach you, is it okay to leave a detailed message? yes

## 2021-05-27 NOTE — TELEPHONE ENCOUNTER
Patient calling back  States The [place] form is for intermittent leave for nausea  She said she was covered for  intermittent leave but there were 2 questions not answered in a previous form so her leave was cut off on march 26  She would like to take off for intermittent leave for nausea as needed until her due date 8-11-19  CNM can call the [facility] if have questions

## 2021-05-28 NOTE — TELEPHONE ENCOUNTER
TC:  Called Sarah 27 yo  at 26/3 weeks in response to her email question about her vaginal symptoms.   LMTC  CLARITZA Donnelly,CAROL

## 2021-05-28 NOTE — PROGRESS NOTES
"Documentation Only: [facility] Transvaginal Cervical Assessment: 27.1mm, with valsalva: 25.8 mm.  \"Cervical length remains >25 mm, above the threshold for treatment\". Anterior placenta, normal AF.  Pt has f/u comprehensive US in 1 week.  "
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
## 2021-05-28 NOTE — PROGRESS NOTES
"Please see \"Imaging\" tab under \"Chart Review\" for details of today's visit.    Rosa Oliveira        "

## 2021-05-30 NOTE — PATIENT INSTRUCTIONS - HE
[facility] Nurse Midwives - Contact information:  Appointment line and to get a hold of CNM in clinic Monday-Friday 8 am - 5 pm:  [phone].  There are some clinics with early start times (1st appointment 7:40 am) and others with evening hours (last appointment 6:20 pm).  Most are typically open from 8 am to 5 pm.    CNM on call answering service: [phone].  Specify your hospital of choice and leave a brief message for CNM;  will then page CNM who is on call at your specified hospital and you should receive a call back with 15 minutes.  Be sure that your ringer is audible and that you can accept blocked calls so that we can get back in touch with you! This number should be reserved for urgent needs if during the day, before 8 am, after 5 pm, weekends, holidays.    Contact the on-call CNM with warning signs, such as:    vaginal bleeding     Vaginal discharge and itching or pain and burning during urination    Leg/calf pain or swelling on one side    severe abdominal pain    nausea and vomiting more than 4-5 times a day, or if you are unable to keep anything down    fever more than 100.4 degrees F.          You are invited to  Meet the [facility] Nurse-Midwives  A way to tour the hospital Labor and Delivery unit and meet the midwives that attend births since you may not have the opportunity to meet them during your prenatal care.  Some sessions are informal meet and greet type social hours, others address a specific concern or topic.    Tuesday, Februrary 12, 2019 7-8pm  [facility]    Wednesday, April 17, 2019 7-8pm  [facility]    Thursday, August 15, 2019 7-8pm  [facility]    Wednesday November 13, 2019 7-8 pm  [facility]    Please call [phone] to register        Touring the Maternity Care Center  To schedule a tour at either [facility] or [facility], please do so online using  the following links:  [place] - [email or web address]  [facility] - [email or web address]      Car Seat Clinics:  https://dps.[place].gov/divisions/ots/child-passenger-safety/Pages/car-seat-checks.aspx  [facility]    Free Car Seat Distribution Facilities     By Appt. Address Contact Information (For appointment)      \Yes [employer]\[address]\[place],\cell Priya Camacho)-\[email or web address]      Yes [facility]\ [facility]\[place],\cell Rachelle Gao)[phone]\[email or web address]      Yes [facility]\[address] [facility]\[place],\cell Rosemary Snell)[phone]\[email or web address]      Immunizations:  http://www.cdc.gov/vaccines/schedules/easy-to-read/child.html      Postpartum Depression  The first weeks of caring for a  baby are more than a full-time job. Although it is often a happy time, your feelings and moods may not be what you expected. Many women experience  baby blues.  Here are some tips to help you understand when feelings of sadness are normal, and when you should call your health care provider.    What are the baby blues?  As many as 3 of every 4 women will have short periods of mood swings, crying, or feeling cranky or restless during the first weeks after birth. These feelings can be worse when you are tired or anxious. Women who have the baby blues often say they feel like crying but don t know why. Baby blues usually happen in the first or second week postpartum (after you give birth) and last less than a week. If you are not sleeping, becoming more upset, don t feel like you can take care of your baby, or your sadness lasts 2 weeks or more, call your health care provider.    What is postpartum  depression?  About one in every 5 women will develop depression during the first few months postpartum that may be mild, moderate, or severe. Women who have postpartum depression may have some of these symptoms:    Feeling guilty     Not able to enjoy your baby and feeling like you are not bonding with your baby      Not able to sleep, even when the baby is sleeping    Sleeping too much and feeling too tired to get out of bed    Feeling overwhelmed and not able to do what you need to during the day    Not able to concentrate    Don t feel like eating    Feeling like you are not normal or not yourself anymore    Not able to make decisions    Feeling like a failure as a mother    Feeling lonely or all alone    Thinking your baby might be better off without you  If you have any of these symptoms, call your health care provider!    Which symptoms of postpartum depression are dangerous?  Sometimes a woman with postpartum depression will have thoughts of harming herself or her baby. If you find yourself thinking about hurting yourself or your baby, call your health care provider immediately.    MOTHERHOOD: THE EARLY DAYS  You prepare for the birth of your baby for many months during pregnancy, and then the first months at home after your baby is born can be a quiet, gentle time of getting to know this new person who has come to live in your home. But for most women it is not all quiet or sweet. And for some women it is a very hard time.  What Can I Expect in the First Few Months After the Baby Comes?  New mothers and their families face many challenges in the first few months:    Your body and your hormones have to get back to normal.    You and the baby will be learning to breastfeed.    Babies only sleep a few hours at a time. The entire family will have a hard time getting enough sleep.    You and your family need to learn how to parent this new family member.    If you have a partner, you have to figure out how to  stay together as a couple and maybe even start to have sex again.    You may have to figure out how to keep from getting pregnant again right away.    You may need to return to work and find day care.    How Long Will it Take for My Body to Get Back to Normal?  Some changes will occur quickly. Others will not occur as quickly.    Your uterus, cervix, and vagina will all shrink to their nonpregnant size in about 2 weeks. Your vagina may be tender and dry for a few months--especially if you are breastfeeding.    If you had stitches or hemorrhoids, your   bottom   will be sore for 2 weeks or more.    For some women who have problems urinating, it can take several months for you to be able to hold your urine when you cough or sneeze or suddenly  something heavy.    Your breast milk will   come in   2 to 3 days after the birth of your baby. It will take 6 to 8 weeks for you and the baby to get the hang of breastfeeding and find a pattern. During these first weeks, you can have engorged breasts at times and often leak milk.    Your stomach and intestines all have to fall back into place. You may have a lot of gas for a few weeks.    You may be constipated--especially if you are breastfeeding.    Your stretched stomach muscles can recover in a few weeks, but for some women it takes longer--6 months or a year--to recover.    If you had a  delivery, you may have pain or numbness around the incision for 6 months or more.    Losing the weight you gained during pregnancy will probably take 6 months to a year. Have patience! It took 40 weeks to get here. Give yourself 40 weeks to get back.    What Can I Expect When My Hormones Change?  About 75% of all women will get the   blues.   This usually starts about 3 days after the birth of your baby. You may cry easily and feel very, very tired. A few women become very depressed. If you had a  delivery or your new baby was sick, you are at a higher risk for  depression.  Call your health care provider right away if you cannot care for yourself or your baby, if you feel very nervous or worried, if you cannot stop crying, or if you are having thoughts of hurting yourself or your baby.    Taking Care of Yourself  While you are still pregnant:    Talk with your partner and your family about the time ahead. Arrange for someone to help you during the first weeks at home if you can.    Talk with your health care provider about birth control options and make a plan before the baby comes.    If you are worried about how to parent a , take parenting classes. You will learn a lot about how babies act and you will make some friends who are going through the same thing at the same time. Most [facility] have these classes.    Arrange for someone to help with baby care if you can.  After the baby comes:    Ask for help. Let other people do the cooking and cleaning and run the house. Focus on yourself and your baby.    Sleep whenever you can. Try not to be tempted to   get some things done   when the baby sleeps. This is your time to sleep, too.    Drink lots of water. You will need at least 6 big glasses of water everyday to avoid constipation and make enough breast milk. Every time you sit down to breastfeed, have a big glass of water with you to drink while you are nursing.    Eat lots of vegetables and fruit. You will need lots of vitamins and fiber to help your body get back to normal. This will also help you avoid constipation.    Go outside and walk. Babies can go outside even if it is very cold. Fresh air and sunshine will do you both good.    Take sitz baths. Put about 6 inches of warm water in your bathtub and sit in there for 15 minutes 2 to 3 times a day. This will help your   bottom   heal more quickly. It will also give you 15 minutes of private time!    Talk to other mothers. Join a new parents group. Call Del and go to [facility] meetings if you are  breastfeeding.     With your partner:    Keep talking. Share the experience.    Spend time alone. Even a 30-minute walk can be a date.    Start a birth control method. You can get pregnant before you even have a period. It is very important to use birth control if you do not want to get pregnant again right away.    When you have sex, use a lubricant. A lot of lubricant! Take it slow.  The first few months after a baby comes can be a lot like floating in a jar of honey--very sweet and weber, but very sticky, too. Take time to enjoy the good parts. Remind yourself that this time will pass. Bon voyage!    FOR MORE INFORMATION  For questions about depression during and after pregnancy:  http://www.womenshealth.gov/publications/our-publications/fact-sheet/depression-pregnancy.html   After birth: The first 6 weeks:  http://www.[employer]/Post-Birth-and-Recovery   Breastfeeding resources:  http://www.[employer].org/health-info/getting-breastfeeding-off-to-a-good-start/    HEALTHY PREGNANCY CARE: 37 to 41 WEEKS PREGNANT    Talk with your midwife or physician about when to call with signs of labor    Regular uterine contractions that are getting closer together and/or stronger    If you think your water has broken or is leaking    Bleeding from the vagina like a period (bloody vaginal discharge is normal)    If you are not feeling your baby move    Make plans for transportation and  as needed for when you are going to the hospital.    Your midwife or physician may offer to check your cervix for changes.     Ask your health care provider about vaccinations you may need following delivery. By now, you should have received a Tdap immunization to protect against pertussis or whooping cough. Fathers and family members who will be in close contact with the baby should also receive a Tdap shot at least two weeks before the expected birth of the baby if they have not had a Td (tetanus) shot for at least  two years.    If you are past your due date, discuss the next steps leading to delivery with your midwife or physician. If you don't start labor on your own by 41 or 42 weeks, your midwife or physician may recommend giving you medicines to ripen your cervix and start labor.    Preparing for your baby: Tell your midwife or physician how you plan to feed your baby (breast or bottle), who you have chosen to do pediatric care for your baby, and if you have a boy, whether you have chosen to have him circumcised. You will need a car seat correctly installed in your vehicle to bring your baby home. As you start to set up the nursery at home for your baby, make sure the crib is safe. The mattress needs to fit snugly against the edges of the crib. If you can fit a soda can between the bars, they are too far apart and can allow the baby's head to caught between them.    Learn about infant care and feeding, including information about infant CPR. We recommend that you put your baby to sleep on his or her back to reduce the chance of Sudden Infant Death Syndrome (SIDS). To maintain a healthy environment in which your child can grow, it's best to keep your home smoke-free. By preparing ahead, your transition into parenthood will go smoothly for you and your baby.    Your midwife or physician will want to see you for a checkup 2 to 6 weeks after delivery.    If you have questions about any symptoms you are experiencing or any other concerns, call your provider or their clinic staff at [facility] at Phone: [phone]. If it's after clinic hours, physician patients should call the [facility] at [phone] ([phone]); midwife patients should call their answering service at [phone].    How can you care for yourself at home?   You can refer to the Starting Out Right book or find it online at http://www.[facility].org/images/stories/maternity/[facility]-Starting-Out-Right.pdf or  http://www.[facility].org/images/stories/flipbooks/[facility]-starting-out-right/[facility]-starting-out-right.html#p=8     You can sign up for a weekly parenting e-mail that gives support, tips and advice from health care professionals that starts with pregnancy and continues through the toddler years. To register, go to [email or web address] at any time during your pregnancy.        Making Plans for Feeding My Baby    By this point, you probably have read a lot about feeding your baby.  Breastfeed or formula? Each mother s decision is her own and [facility] respects you and your choices. We ve gathered information on both breastfeeding and formula feeding to help with your decision. Talking with your physician or nurse-midwife can also help in your decision.  However you plan to feed your baby, [facility] encourage rooming in with your baby, skin-to-skin contact and feeding your baby based on his or her cues.    Skin-to-skin contact  Being close to mom helps your baby adjust to life outside of the womb.  It helps your baby regulate their body temperature, heart rate, and breathing.  Your baby will usually be placed skin-to-skin immediately following birth or as soon as possible, if medical intervention is needed.    Rooming-In  Having your baby stay with you in your room is called  rooming-in .  Keeping your baby in your room helps you to learn how to care for your baby by getting to know your baby s cues, body rhythms and sleep cycle.       Cue-based feeding  Cues (signals) are baby s way of telling you what he or she wants.  When you learn your infant s cues, you know how to care for and feed your baby.   Feeding cues are the licking and smacking of lips, bringing their fist to their mouth, and a reflex called  rooting - where baby turns and opens his or her mouth, searching for the breast or bottle.  Crying is a late feeding cue.  Babies can feed frequently, often at least 8 times in 24  hours.  Breastfeeding facts  Breast milk is the best source of nutrition for your baby and is available at birth. In the first couple of days, your milk volume is already starting to increase, though it may not be noticeable. Breastfeed frequently to increase your milk supply. Within three to five days, you will begin to notice larger milk volumes. An increase in breast size, heaviness and firmness are often described as the milk  coming in.  Frequent breastfeeding can help breasts from getting overly firm and painful. You will know the baby is getting enough milk if your baby is having wet and dirty diapers and gaining weight.     If your goal is to exclusively breastfeed, it is important to not use any formula or artificial nipples (including bottles and pacifiers) while your baby is learning to breastfeed.  While it may seem like an  easy  option to give your baby a bottle, formula should only be given if there is a medical reason for your baby to have it.    Positioning and attachment   Get comfortable.  Use pillows as needed to support your arms and baby.  Hold baby close at the level of your breast, facing you in a tummy to tummy position.  Skin to skin helps with this.  Position the baby with his or her nose by the nipple.  There should be a straight line from baby s ear to shoulder to hips.  Tickle your baby s lips or wait for baby to open mouth wide, bring baby to breast by leading with the chin.  Aim the nipple at the roof of baby s mouth.  A rapid sucking pattern is followed by longer, drawing pattern with occasional swallows heard.  When baby is correctly latched, your nipple and much of the areola are pulled well into baby s mouth.      Returning to work or school  Focus on a good start to breastfeeding.  Many women continue to provide breastmilk for their baby when they return to work or school.  Making plans about where to pump and store milk can make the transition go well.  Talk with other mothers  who have also returned to work or school for tips and support.  Your employer s Human Resource department may be a resource as well.     Returning to work or school: (continued)     babies can mean fewer  sick  days for you.    A quality breast pump will also save time and add comfort.  Check with your insurance prior to giving birth for breast pump coverage.  Many insurance companies include a pump within your benefits.    Wait until your baby is at least three weeks old to introduce a bottle for the first time.  Have someone besides you give the bottle.    Breastfeed when you are with your baby. Reserve your bottles of breast milk for when you are away.     Your breasts will need to be  emptied  either by your baby or a pump.  Plan to pump at least twice in an eight hour day.    If you cannot pump at work, continue breastfeeding at home. Any amount of breast milk is worth giving to your baby.    Formula feeding facts  If you are planning to use formula to feed your baby, you will want to make some preparations ahead of time. Talk to your doctor or nurse-midwife about what type of formula to use. Some are iron-fortified, meaning they have extra iron in them. You will want to purchase formula and bottles before your baby is born to be sure you are ready after you return from the hospital. The [facility] do not provide formula samples to take home.    Be sure to follow formula mixing directions closely. Regular milk in the dairy case at the grocery store should not be given to babies under 1 year old. Baby formula is sold in several forms including:    Ready-to-use. This is the most expensive, but no mixing is necessary.    Concentrated liquid. This is less expensive than ready-to-use and you mix with water.    Powder. This is the least expensive. You mix one level scoop of powdered formula with two ounces of water and stir well.    Most babies need 2.5 ounces of formula per pound of body weight  each day. This means an 8-pound baby may drink about 20 ounces of formula a day; however, this is just an estimate. The most important thing is to pay attention to your baby s cues.  If your baby is always fussy, needs more iron or has certain food allergies, your physician may suggest you change your baby s formula to a different kind.     How do I warm my baby s bottles?  You may feed your baby a bottle without warming it first. It is OK for the breast milk or formula to be cool or room temperature. If your baby seems to prefer it warmed, you can put the filled bottle in a container of warm water and let it stand for a few minutes. Check the temperature of the liquid on your skin before feeding it to your baby; to be sure it isn t too hot. Do not heat bottles in the microwave. Microwaves heat food and liquids unevenly, and this can cause hot spots that can burn your baby.    How do I clean and sterilize bottles?  Sterilize bottles and nipples before you use them for the first time. You can do this by putting them in boiling water for 5 minutes. After that first time, you can wash them in hot and soapy water. Rinse them carefully to be sure there is no soap left on them. You can also wash them in the .    [facility] [phone] ([phone])    Share with Women\Tatyana I in Labor?  What is labor? Labor is the work that your body does to birth your baby. Your uterus (the womb) contracts(tightens). The contractions(labor pains) push your baby down onto your cervix(the opening ofyour uterus). Thispressure causesyour cervix to open. When your cervix iscompletely open (10 centimeters dilated), you will push your baby through your vagina and out into the world.  What do contractions feel like? When contractionsfirst start, theyusually feellike cramps duringyour period. Sometimesyoufeelpain in your back. Mostoften,contractions feel like muscles pulling painfully in your lower belly. At first, the contractions will  probably be 15 to 20 minutes apart.They maybe irregular and will not feel too painful. As labor goes on, the contractionsget stronger,closer together, more consistent, and more painful.  How do I time the contractions? When the contractionsseem to be coming regularly, youshould start to time them.You time your contractions by counting the number of minutes from the start of one contraction to the start of the next contraction.  What should I do during early labor when the contractions start? If it is night andyoucan sleep, do so. If it happensduring the day, there are some things you can do to take care of yourself at home: Walk. If the painsyou are having are reallabor, walking will makethecontractionscome closer together and they will be stronger,but you will be able to cope with them better if you are standing or moving around. If the contractions are early labor ones that come andgo (sometimes called false labor), walkingcan make them go away. Take a shower or bath. This will help you relax. Eat. Labor is a big event.Your body needs a lot of energy to be effective.Eat whatever you feel like eating. Drink water. Not drinking enough water can cause contractions to not be as effectiveas theyshould be.You need to be well hydrated (drinking enoughwater) to help your body work well during labor. Take a na p. If youfeel tired, lay down on your side and get all the rest you can. It helps to be rested when you go intoactive labor. Do something you enjoy. Spend time with family. Watch a movie. Distraction will help you relax. Get a massage. If your labor is in your back, a strong massage on your lower back may feel very good. Getting a foot massage or having a partner rub your feet can also be very relaxing. Don t panic. You can do this. Your body was made for this. You are strong!  When should I call my health care provider if I think I am in labor? Your contractions have been 5 minutes or less apart for at least an  hour. Your contractions are becoming so painful youcannot walk or talk during one. You think your amniotic sac (bag of de la garza) breaks. You may have a big gush of amniotic fluid (water) or just fluid that runs down your legs when you walk or move or change position.  Are there other reasons to call my health care provider? If you are concerned about anything, don t hesitate to call your health care provider.You should definitely call your health care provider or go to the hospital if:  It is 3 weeks or more before your due date, and you are having contractions.  You have vaginal bleeding that is more than your period, soaks your underwear, or runs down your legs.  You have sudden severe pain that does not go away with rest.  Your baby has not movedfor several hours.  You are leaking greenish fluid.    For More Information: http://onlinelibrary.kelsey.com/doi/10.1111/jmwh.83339/epdf     US Department of Health and Human Services: Signs oflabor,labor stages, and types of birth  http://womenshealth.gov/pregnancy/childbirth-beyond/labor-birth.html#a      Childbirth and Parenting Education:   [place] parenting center: http://WeStore/   (030) 260-BABY  [place]: (education, yoga & wellness) www.[employer]  Enlightened Mama: www.enlightenedSparta Systems.[employer] [employer]   Childbirth collective: (Parent topic nights)  www.childbirthcollective.org/  Hypnobabies:  www.hypnobabiestwincities.com/  Hypnobirthing:  Http://hypnobirthing.com/    Book Recommendations:   Karen [place]'s Birthing From Within--first few chapters include a new-age tone, you may prefer to skip it and keep going, because there is good stuff later.  This book recommendation covers emotional preparation, but does cover coping with pain, and use of both pharmacological and nonpharmacological methods.    Dr. Guerrero' The Pregnancy Book and The Birth Book--the pregnancy book goes month-by month    Womanly Art of Breastfeeding by La Leche [employer]   Bestfeeding by  "Mavis Guajardo--great pictures    Mothering Your Nursing Toddler, by Yvonne Vivar.   Addresses dealing with so many of the challenging behaviors of a nursing toddler.  How Weaning Happens, by La Leche League.  Discusses weaning at all ages, from medically necessary weaning of an infant, all the way up to age 5 (or older), with why/why not, and strategies.  Very empowering book both for deciding to wean and deciding not to.    American College of Nurse-Midwives (ACNM) http://www.midwife.org/; look at the informational handouts at http://www.midwife.org/Share-With-Women     www.mymidwife.org    Mother to Baby (Medication and Herbal guidance in pregnancy): http://www.mothertobaby.org  Toll-Free Hotline: 747.672.8566  LactMed (Medication use while breastfeeding): http://toxnet.nlm.nih.gov/newtoxnet/lactmed.htm    Women's Health.gov:  http://www.womenshealth.gov/a-z-topics/index.html    American pregnancy association - http://americanpregnancy.org    Centering Pregnancy (group prenatal care option): http://centeringhealthcare.org    Information about doulas:  Childbirth collective: http://www.childbirthcollective.org/  Doulas of [place] (TATIANA):  www.tatiana.org  [facility]  project: http://twincitiesdoulaproject.com/     Early Childhood and Family Education (ECFE):  ECFE offers parents hands-on learning experiences that will nourish a lifetime of teachable moments.  http://ecfe.info/ecfe-home/    March of Dimes www.marchIndige[employer].com     FDA - Nutrition  www.mypyramid.gov  Under \"For Consumers,\" click on \"pregnant and breastfeeding women.\"      Centers for Disease Control and Prevention (CDC) - Vaccines : http://www.cdc.gov/vaccines/       When researching information on the web, question the validity of websites.  The domains .gov, .edu and.org tend to be more reliable information.  If there are a lot of advertisements, be cautious of the information provided. Stay away from blogs and chat rooms please! "

## 2021-05-30 NOTE — TELEPHONE ENCOUNTER
Name of caller: Patient  Phone number where you may be reached: [phone]  Reason for call: Pt called looking to schedule an appt for today but all clinics were full by the time she called. Pt then said that she had stomach pain so writer offered to transfer her to the on-call CNM. Pt either hung up or the call disconnected. Pt's last appt was on 4/10. Please call pt to discuss symptoms and lapse in care.  Best time to call back: any  If we don't reach you, is it okay to leave a detailed message? no

## 2021-05-30 NOTE — PROGRESS NOTES
Please see full MFM US report under Epic Imaging tab.     Findings reviewed with Sarah today, including resolution of the renal pelvis dilation and the suspected CSP variant. GIven her advanced gestational age and the otherwise normal intracranial evaluation, I do not recommend a fetal MRI, but I do recommend notifying pediatrics after delivery of this finding, in case additional evaluation is warranted. No further M follow up is indicated at this time.     Betito Duran MD  Maternal - Fetal Medicine  [facility]

## 2021-05-30 NOTE — TELEPHONE ENCOUNTER
"TC:  Attempted to call Sarah via the # listed. Received a message stating \"subscriber you have called is not in service\". Sarah is concerned that her baby has brain injury following her last [facility] visit. The ultrasound shows that the Cavum Septi Pellucidi is elongated, this is likely a variation of normal.    Send an email message to ask that she call this writer back    CLARITZA Donnelly CNM      Left a message via Mapittrackit and Sarah gave me her update number. [phone]    Called Sarah and left a message to discuss her US results.    CLARITZA Donnelly CNM    Spoke with Dr. Coburn regarding Sarah's ultrasound who confirmed that the elongated CSP is a variation of normal. No further imaging is recommended. She recommends that we inform the pediatric team in case some neurological event occurs, but she does not anticipate that this would happen.   Reassured Sarah that the elongated CSP is considered a variation of normal.     Sarah is also wondering if she should be getting IV iron infusions. Advised that we do not typically recommend this if she responds to oral therapy. Will repeat Hgb today. Sarah will discuss this further at her appt today    CLARITZA Donnelly CNM    "

## 2021-05-30 NOTE — TELEPHONE ENCOUNTER
The patient is 35 weeks pregnant    The pt has been with her partner with 5 years and just found that he has been with another person with her for a year and that she is also pregnant and he has left her for this other woman.    Does have thoughts about death and wants the pain to stop.    No plans to hurt herself as she wants her baby.    No plans to hurt others    Everything else is OK.  Hasn't been working since April since she has been sick, she doesn't have a job.    She is currently with her mom and she is able to talk to her, she is also able to talk to her sister.    She doesn't have a therapist    No ETOH/or drug use    LUCRETIA is 8/11/19    Pt given mental health warm line and crisis lines numbers. Told to go to ER if she feels she needs to be seen sooner.    Appointment scheduled.    Geno Veliz RN  [facility] Medication Refill and Triage Nurse  7/11/2019  3:26 PM      Reason for Disposition    Sometimes has thoughts of suicide    Protocols used: DEPRESSION-A-OH

## 2021-05-30 NOTE — TELEPHONE ENCOUNTER
TELEPHONE CALL:  Noted that Sarah cancelled her appointment via My Chart today (after rescheduling to a later time).  She also cancelled and rescheduled her appointment 3x last week and ultimately was not seen.      LVM for her to call CNM clinic back, as she should be seen weekly and I'm concerned about her cancelling appts (though only left generic voicemail to call CNM line back).

## 2021-05-30 NOTE — PROGRESS NOTES
Ultrasound reviewed: does not recommend fetal MRI but notify pediatrics following delivery. No further follow up with M recommended at this time.

## 2021-05-30 NOTE — PROGRESS NOTES
"Sarah is here by herself for routine prenatal visit at 36 weeks and 3 days.  She states that she talked to nurse triage several days ago (triage note in chart and reviewed) as she just recently found out that her partner had been having relationship with another woman for the last 10 months.  She felt really down about that.  She denies thoughts of self-harm today, and is very open to the idea of therapy and accepts her referral to [facility].  She is currently living by herself and her and her partners home.  Her partner is staying with a friend.  This has been very difficult to work through.  She asked for STI screening today, which we will perform.    She is also concerned about \"bumps\" that she is noted throughout the pregnancy and the inside of her labia.  I examined these and I do not see anything abnormal, no signs of herpes or condyloma.  Reassurance provided, though she is still concerned about this.  Other CNM's have observe this as well.  We did discuss the role of this postpartum depression.  She states that her mom is here until September and has set for support.    GBS done today.  Baby confirm vertex via bedside ultrasound.  She was happy to see her baby, and he was doing some practice swallowing/breathing movements which was very exciting for her to see.    She has not scheduled her follow-up [facility] ultrasound for fetal pyelectasis.  Discussed this with her and she seems to have forgotten this.  She knew that she should have followed up.  And gave her the number and reordered the referral.    Her baby has been very active, we discussed monitoring daily fetal movements.  Also gave handout on signs and symptoms of labor.  I advised her on the on-call CNM number, and also asked her to call that with any concerns such as depression or thoughts of self-harm.  I had the CA set her up for weekly visits after this 1.  "

## 2021-05-30 NOTE — TELEPHONE ENCOUNTER
Sarah reached by phone.   She explains that stress at home has kept her from regular PN visits.  Relationship with FOB ended with this pregnancy, but they continue to live with one another. Denies IPV. Has reliable transportation.   Desires to continue care with HE CNMs.  Emotional support provided.  Denies ever having received Rhophylac after AB x 3.  Explained that 1 hr GCT, Hgb, RPR, Ab screen and Rhophylac will be the focus of the next PN visit ideally on Monday 7-1-19.  This note will go to Midwife Schedulers to call pt Monday morning to make appt.  As well, US with MFM 4-29-19 recommended repeat US in 8 weeks (about now). Enc pt to make that appt as well. She has the phone#.  Baby is active, and she denies s/sx PTL now. Seen in ER ([place]) & dx'd with  [facility].  Danger s/sx reviewed. Enc daily [facility].  All ?s answered.  Expressed ongoing support & importance of regular PN visits. Pt verbalizes understanding.

## 2021-05-30 NOTE — PATIENT INSTRUCTIONS - HE
Patient Education   HEALTHY PREGNANCY CARE: 34-36 WEEKS PREGNANT    Your baby is gaining about an ounce each day, so healthy nutrition and rest are still very important. Learn about late pregnancy symptoms, such as constipation and backaches. Drinking more fluids and eating more fiber can relieve constipation. The pelvic tilt and a heating pad can ease backaches.    Continue to watch for signs and symptoms of preeclampsia:     Sudden swelling of your face, hands, or feet     New vision problems such as blurring, double vision, or flashing lights    A severe headache not relieved with acetaminophen (Tylenol)    Sharp or stabbing pain in your right or middle upper abdomen    You're almost done with your pregnancy but it's still too soon to have your baby. The goal is to have your baby after 37 weeks, so watch for signs and symptoms of premature labor:     Regular contractions. This means having about 6 or more within 1 hour, even after you have had a glass of water and are resting.     A backache that starts and stops regularly.    An increase or change in vaginal discharge, such as heavy, mucus-like, watery, or bloody discharge.     Your water breaks or leaks.    You will be tested for group B streptococcus (GBS), a type of bacteria found in 10-30% of pregnant women. A woman with GBS can pass it to her baby during delivery. Most babies who get GBS from their mothers do not have any problems, but some babies get very ill and need to be hospitalized for antibiotic treatment. Treating you with antibiotics during labor and delivery helps to prevent infection in your baby.    Review information on postpartum care that you will need after the baby is born.  Discuss your choices and plans for birth control with your midwife or physician.     Postpartum Care  During the days and weeks after the delivery of your baby (postpartum period), your body will change as it returns to its nonpregnant condition. As with pregnancy  "changes, postpartum changes are different for every woman.    Physical changes after childbirth  The changes in your body may include:    Contractions called afterpains shrink the uterus for several days after childbirth. Shrinking of the uterus to its prepregnancy size may take 6 to 8 weeks.    Sore muscles (especially in the arms, neck, or jaw) are common after childbirth. This is because of the hard work of labor and pushing your baby out. The soreness should go away in a few days.    Bleeding and vaginal discharge (lochia) may last for 2 to 8 weeks, and can come and go for about 2 months.    Vaginal soreness, including pain, discomfort, and numbness, is common after vaginal birth. Soreness may be worse if you had a perineal tear or episiotomy.    If you had a  birth (), you may have pain in your lower abdomen and may need pain medicine for 1 to 2 weeks.    Breast engorgement is common around the 3rd or 4th day after delivery, when the breasts begin to fill with milk. This can cause discomfort and swelling. If you are breast feeding, the best treatment is to feed your baby often or pump the milk out. You can also use warm compresses. If you are not breast feeding, placing ice packs on your breasts, taking a hot shower, or using warm compresses may relieve the discomfort.    Be aware of postpartum depression    \"Baby blues\" are common for the first 1 to 2 weeks after birth. You may cry or feel sad or irritable for no reason.     For some women, these feelings last longer and are more intense. This is called postpartum depression.     If your symptoms last for more than a few weeks or you feel very depressed, ask your midwife or physician for help.     Postpartum depression can be treated. Support groups and counseling can help, but sometimes medication is needed.     Discuss follow-up appointments with your midwife or physician, as well. He or she will usually want to see you 6 weeks after the " birth of your baby, sooner if you are having problems.    If you have questions about any symptoms you are experiencing or any other concerns, call your provider or their clinic staff at [facility] at Phone: [phone]. If it's after clinic hours, physician patients should call the [facility] at [phone] ([phone]); midwife patients should call their answering service at [phone].    How can you care for yourself at home?   You can refer to the Starting Out Right book or find it online at http://www.healtheast.org/images/stories/http://www.healtheast.org/images/stories/maternity/[facility]-Starting-Out-Right.pdf or http://www.healtheast.org/images/stories/flipbooks/[facility]-starting-out-right/[facility]-starting-out-right.html#p=8     You can sign up for a weekly parenting e-mail that gives support, tips and advice from health care professionals that starts with pregnancy and continues through the toddler years. To register, go to www.[facility].org/baby at any time during your pregnancy.        Making Plans for Feeding My Baby    By this point, you probably have read a lot about feeding your baby.  Breastfeed or formula? Each mother s decision is her own and [facility] respects you and your choices. We ve gathered information on both breastfeeding and formula feeding to help with your decision. Talking with your physician or nurse-midwife can also help in your decision.  However you plan to feed your baby, [facility] encourage rooming in with your baby, skin-to-skin contact and feeding your baby based on his or her cues.    Skin-to-skin contact  Being close to mom helps your baby adjust to life outside of the womb.  It helps your baby regulate their body temperature, heart rate, and breathing.  Your baby will usually be placed skin-to-skin immediately following birth or as soon as possible, if medical intervention is needed.    Rooming-In  Having your baby stay with you  in your room is called  rooming-in .  Keeping your baby in your room helps you to learn how to care for your baby by getting to know your baby s cues, body rhythms and sleep cycle.       Cue-based feeding  Cues (signals) are baby s way of telling you what he or she wants.  When you learn your infant s cues, you know how to care for and feed your baby.   Feeding cues are the licking and smacking of lips, bringing their fist to their mouth, and a reflex called  rooting - where baby turns and opens his or her mouth, searching for the breast or bottle.  Crying is a late feeding cue.  Babies can feed frequently, often at least 8 times in 24 hours.  Breastfeeding facts  Breast milk is the best source of nutrition for your baby and is available at birth. In the first couple of days, your milk volume is already starting to increase, though it may not be noticeable. Breastfeed frequently to increase your milk supply. Within three to five days, you will begin to notice larger milk volumes. An increase in breast size, heaviness and firmness are often described as the milk  coming in.  Frequent breastfeeding can help breasts from getting overly firm and painful. You will know the baby is getting enough milk if your baby is having wet and dirty diapers and gaining weight.     If your goal is to exclusively breastfeed, it is important to not use any formula or artificial nipples (including bottles and pacifiers) while your baby is learning to breastfeed.  While it may seem like an  easy  option to give your baby a bottle, formula should only be given if there is a medical reason for your baby to have it.    Positioning and attachment   Get comfortable.  Use pillows as needed to support your arms and baby.  Hold baby close at the level of your breast, facing you in a tummy to tummy position.  Skin to skin helps with this.  Position the baby with his or her nose by the nipple.  There should be a straight line from baby s ear to  shoulder to hips.  Tickle your baby s lips or wait for baby to open mouth wide, bring baby to breast by leading with the chin.  Aim the nipple at the roof of baby s mouth.  A rapid sucking pattern is followed by longer, drawing pattern with occasional swallows heard.  When baby is correctly latched, your nipple and much of the areola are pulled well into baby s mouth.      Returning to work or school  Focus on a good start to breastfeeding.  Many women continue to provide breastmilk for their baby when they return to work or school.  Making plans about where to pump and store milk can make the transition go well.  Talk with other mothers who have also returned to work or school for tips and support.  Your employer s Human Resource department may be a resource as well.     Returning to work or school: (continued)     babies can mean fewer  sick  days for you.    A quality breast pump will also save time and add comfort.  Check with your insurance prior to giving birth for breast pump coverage.  Many insurance companies include a pump within your benefits.    Wait until your baby is at least three weeks old to introduce a bottle for the first time.  Have someone besides you give the bottle.    Breastfeed when you are with your baby. Reserve your bottles of breast milk for when you are away.     Your breasts will need to be  emptied  either by your baby or a pump.  Plan to pump at least twice in an eight hour day.    If you cannot pump at work, continue breastfeeding at home. Any amount of breast milk is worth giving to your baby.    Formula feeding facts  If you are planning to use formula to feed your baby, you will want to make some preparations ahead of time. Talk to your doctor or nurse-midwife about what type of formula to use. Some are iron-fortified, meaning they have extra iron in them. You will want to purchase formula and bottles before your baby is born to be sure you are ready after you return  from the hospital. [facility] do not provide formula samples to take home.    Be sure to follow formula mixing directions closely. Regular milk in the dairy case at the grocery store should not be given to babies under 1 year old. Baby formula is sold in several forms including:    Ready-to-use. This is the most expensive, but no mixing is necessary.    Concentrated liquid. This is less expensive than ready-to-use and you mix with water.    Powder. This is the least expensive. You mix one level scoop of powdered formula with two ounces of water and stir well.    Most babies need 2.5 ounces of formula per pound of body weight each day. This means an 8-pound baby may drink about 20 ounces of formula a day; however, this is just an estimate. The most important thing is to pay attention to your baby s cues.  If your baby is always fussy, needs more iron or has certain food allergies, your physician may suggest you change your baby s formula to a different kind.   How do I warm my baby s bottles?  You may feed your baby a bottle without warming it first. It is OK for the breast milk or formula to be cool or room temperature. If your baby seems to prefer it warmed, you can put the filled bottle in a container of warm water and let it stand for a few minutes. Check the temperature of the liquid on your skin before feeding it to your baby; to be sure it isn t too hot. Do not heat bottles in the microwave. Microwaves heat food and liquids unevenly, and this can cause hot spots that can burn your baby.    How do I clean and sterilize bottles?  Sterilize bottles and nipples before you use them for the first time. You can do this by putting them in boiling water for 5 minutes. After that first time, you can wash them in hot and soapy water. Rinse them carefully to be sure there is no soap left on them. You can also wash them in the .    [facility] [phone] ([phone])
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
## 2021-05-30 NOTE — PROGRESS NOTES
Patient was seen in [facility] clinic on 4/29/19.  At that time a repeat ultrasound was recommended in 8 weeks.  Patient was scheduled for this appointment and no showed x3.  Referring clinic notified, removing order.    Moira Hui

## 2021-05-30 NOTE — TELEPHONE ENCOUNTER
"RN Assessment/Reason for Call:   Okay to leave Detailed Message  Patient calling in, Luis Armando anna; having symptoms  Pregnancy 34 weeks; due date 8/11/19   Legs hurting in back, feet started to swell, \"muscle hurts \".   Calves and feet, \"really bad \", came out of now where.  Hurts more when she walks.  She is in [facility].  Recommend she call her insurance to see who is in network.    RN Action/Disposition:  Protocol recommends see Dr in 4 hrs.  Call back if worse symptoms  Discussed home care measures; kick count.  Agrees to plan.     Fozia Dimas, PATRICK    [facility] Triage/med refill  7/6/2019  9:54 AM        Reason for Disposition    [1] Thigh, calf, or ankle swelling AND [2] bilateral AND [3] 1 side is more swollen    Protocols used: PREGNANCY - LEG PAIN-A-AH      "

## 2021-05-31 VITALS — BODY MASS INDEX: 22.44 KG/M2 | WEIGHT: 138 LBS

## 2021-05-31 VITALS — BODY MASS INDEX: 22.66 KG/M2 | WEIGHT: 141 LBS | HEIGHT: 66 IN

## 2021-05-31 VITALS — BODY MASS INDEX: 22.19 KG/M2 | HEIGHT: 66 IN | WEIGHT: 138.06 LBS

## 2021-05-31 VITALS — BODY MASS INDEX: 23.09 KG/M2 | WEIGHT: 142 LBS

## 2021-05-31 VITALS — WEIGHT: 138 LBS | HEIGHT: 66 IN | BODY MASS INDEX: 22.18 KG/M2

## 2021-05-31 VITALS — BODY MASS INDEX: 21.65 KG/M2 | WEIGHT: 134.12 LBS

## 2021-05-31 NOTE — TELEPHONE ENCOUNTER
Telephone call: Sarah Ann delivered on 8/13/19 by Caesarean section for abnormal FHT.  No answer so message was left    Pt invited to come in at 2 weeks to see CNM though understand that she would likely see surgeon at 2 weeks as well.  Invited to call with questions/concerns.  Kenia Javier, CAROL, CLARITZA,CNM

## 2021-06-02 VITALS — HEIGHT: 66 IN | WEIGHT: 146 LBS | BODY MASS INDEX: 23.46 KG/M2

## 2021-06-02 VITALS — WEIGHT: 141 LBS | HEIGHT: 66 IN | BODY MASS INDEX: 22.66 KG/M2

## 2021-06-02 VITALS — BODY MASS INDEX: 22.27 KG/M2 | WEIGHT: 138 LBS

## 2021-06-02 VITALS — WEIGHT: 143.5 LBS | BODY MASS INDEX: 23.16 KG/M2

## 2021-06-02 VITALS — WEIGHT: 146.6 LBS | HEIGHT: 66 IN | BODY MASS INDEX: 23.56 KG/M2

## 2021-06-02 VITALS — BODY MASS INDEX: 23.3 KG/M2 | WEIGHT: 145 LBS | HEIGHT: 66 IN

## 2021-06-02 VITALS — WEIGHT: 151 LBS | HEIGHT: 66 IN | BODY MASS INDEX: 24.27 KG/M2

## 2021-06-02 VITALS — BODY MASS INDEX: 24.06 KG/M2 | HEIGHT: 66 IN | WEIGHT: 149.7 LBS

## 2021-06-03 VITALS — WEIGHT: 156 LBS | HEIGHT: 66 IN | BODY MASS INDEX: 25.07 KG/M2

## 2021-06-03 VITALS — BODY MASS INDEX: 24.86 KG/M2 | WEIGHT: 154 LBS

## 2021-06-05 VITALS
WEIGHT: 152 LBS | TEMPERATURE: 99.5 F | DIASTOLIC BLOOD PRESSURE: 82 MMHG | SYSTOLIC BLOOD PRESSURE: 127 MMHG | RESPIRATION RATE: 16 BRPM | OXYGEN SATURATION: 99 % | HEART RATE: 77 BPM | BODY MASS INDEX: 24.53 KG/M2

## 2021-06-07 ENCOUNTER — COMMUNICATION - HEALTHEAST (OUTPATIENT)
Dept: SCHEDULING | Facility: CLINIC | Age: 31
End: 2021-06-07

## 2021-06-07 ENCOUNTER — COMMUNICATION - HEALTHEAST (OUTPATIENT)
Dept: FAMILY MEDICINE | Facility: CLINIC | Age: 31
End: 2021-06-07

## 2021-06-08 ENCOUNTER — OFFICE VISIT - HEALTHEAST (OUTPATIENT)
Dept: FAMILY MEDICINE | Facility: CLINIC | Age: 31
End: 2021-06-08

## 2021-06-08 DIAGNOSIS — M79.644 PAIN IN FINGER OF BOTH HANDS: ICD-10-CM

## 2021-06-08 DIAGNOSIS — R53.83 FATIGUE, UNSPECIFIED TYPE: ICD-10-CM

## 2021-06-08 DIAGNOSIS — R35.0 URINARY FREQUENCY: ICD-10-CM

## 2021-06-08 DIAGNOSIS — M79.645 PAIN IN FINGER OF BOTH HANDS: ICD-10-CM

## 2021-06-08 LAB
ALBUMIN UR-MCNC: NEGATIVE G/DL
ANION GAP SERPL CALCULATED.3IONS-SCNC: 12 MMOL/L (ref 5–18)
APPEARANCE UR: CLEAR
BILIRUB UR QL STRIP: NEGATIVE
BUN SERPL-MCNC: 12 MG/DL (ref 8–22)
C REACTIVE PROTEIN LHE: <0.1 MG/DL (ref 0–0.8)
CALCIUM SERPL-MCNC: 9.4 MG/DL (ref 8.5–10.5)
CHLORIDE BLD-SCNC: 101 MMOL/L (ref 98–107)
CO2 SERPL-SCNC: 26 MMOL/L (ref 22–31)
COLOR UR AUTO: YELLOW
CREAT SERPL-MCNC: 0.68 MG/DL (ref 0.6–1.1)
ERYTHROCYTE [DISTWIDTH] IN BLOOD BY AUTOMATED COUNT: 13 % (ref 11–14.5)
GFR SERPL CREATININE-BSD FRML MDRD: >60 ML/MIN/1.73M2
GLUCOSE BLD-MCNC: 90 MG/DL (ref 70–125)
GLUCOSE UR STRIP-MCNC: NEGATIVE MG/DL
HCT VFR BLD AUTO: 37.6 % (ref 35–47)
HGB BLD-MCNC: 12.3 G/DL (ref 12–16)
HGB UR QL STRIP: NEGATIVE
KETONES UR STRIP-MCNC: NEGATIVE MG/DL
LEUKOCYTE ESTERASE UR QL STRIP: NEGATIVE
MCH RBC QN AUTO: 26.1 PG (ref 27–34)
MCHC RBC AUTO-ENTMCNC: 32.7 G/DL (ref 32–36)
MCV RBC AUTO: 80 FL (ref 80–100)
NITRATE UR QL: NEGATIVE
PH UR STRIP: 6 [PH] (ref 5–8)
PLATELET # BLD AUTO: 296 THOU/UL (ref 140–440)
PMV BLD AUTO: 9.9 FL (ref 7–10)
POTASSIUM BLD-SCNC: 3.9 MMOL/L (ref 3.5–5)
RBC # BLD AUTO: 4.72 MILL/UL (ref 3.8–5.4)
RHEUMATOID FACT SERPL-ACNC: <15 IU/ML (ref 0–30)
SODIUM SERPL-SCNC: 139 MMOL/L (ref 136–145)
SP GR UR STRIP: >=1.03 (ref 1–1.03)
UROBILINOGEN UR STRIP-ACNC: NORMAL
WBC: 7.1 THOU/UL (ref 4–11)

## 2021-06-09 LAB — 25(OH)D3 SERPL-MCNC: 15 NG/ML (ref 30–80)

## 2021-06-11 LAB — ANA SER QL: 0.3 U
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
## 2021-06-14 ENCOUNTER — COMMUNICATION - HEALTHEAST (OUTPATIENT)
Dept: FAMILY MEDICINE | Facility: CLINIC | Age: 31
End: 2021-06-14

## 2021-06-14 DIAGNOSIS — E55.9 VITAMIN D DEFICIENCY: ICD-10-CM

## 2021-06-14 RX ORDER — ERGOCALCIFEROL 1.25 MG/1
50000 CAPSULE ORAL WEEKLY
Qty: 12 CAPSULE | Refills: 0 | Status: SHIPPED | OUTPATIENT
Start: 2021-06-14 | End: 2021-08-31
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
## 2021-06-16 PROBLEM — D25.1 INTRAMURAL LEIOMYOMA OF UTERUS: Status: ACTIVE | Noted: 2018-12-20

## 2021-06-16 PROBLEM — Z62.819 HISTORY OF ABUSE IN CHILDHOOD: Status: ACTIVE | Noted: 2017-10-03

## 2021-06-16 PROBLEM — Z67.91 RH NEGATIVE STATE IN ANTEPARTUM PERIOD, FIRST TRIMESTER: Status: ACTIVE | Noted: 2018-12-20

## 2021-06-16 PROBLEM — O28.3 ABNORMAL FETAL ULTRASOUND: Status: ACTIVE | Noted: 2019-07-19

## 2021-06-16 PROBLEM — O99.013 ANEMIA DURING PREGNANCY IN THIRD TRIMESTER: Status: ACTIVE | Noted: 2019-07-02

## 2021-06-16 PROBLEM — O26.891 RH NEGATIVE STATE IN ANTEPARTUM PERIOD, FIRST TRIMESTER: Status: ACTIVE | Noted: 2018-12-20

## 2021-06-16 PROBLEM — E55.9 VITAMIN D DEFICIENCY: Status: ACTIVE | Noted: 2017-10-16

## 2021-06-16 PROBLEM — O21.9 VOMITING OR NAUSEA OF PREGNANCY: Status: ACTIVE | Noted: 2018-12-20

## 2021-06-16 PROBLEM — R73.03 BORDERLINE DIABETES: Status: ACTIVE | Noted: 2018-02-22

## 2021-06-16 PROBLEM — O21.0 MILD HYPEREMESIS GRAVIDARUM, ANTEPARTUM: Status: ACTIVE | Noted: 2019-01-23

## 2021-06-16 PROBLEM — N83.202 CYST OF LEFT OVARY: Status: ACTIVE | Noted: 2018-12-20

## 2021-06-16 PROBLEM — Z83.3 FAMILY HISTORY OF DIABETES MELLITUS IN MATERNAL GRANDMOTHER: Status: ACTIVE | Noted: 2018-02-21

## 2021-06-16 PROBLEM — N88.8 NABOTHIAN CYST: Status: ACTIVE | Noted: 2018-12-20

## 2021-06-16 NOTE — PROGRESS NOTES
"Assessment:   Reported vaginal odor  Physical exam unremarkable with the exception of pelvic tenderness  History of STI  History of recurrent bacterial vaginosis   Desires pregnancy     Plan:     -Wet prep, GC/CT, UA/UC collected, pending.   -Treatment: Pending lab results.  -Pt to be notified of treatment/plan.  -Education done regarding vaginal hygeine, probiotics, warm tub baths, avoiding douches, only cotton underwear, and avoiding pantiliners.   -RTC if symptoms persist or worsen.   -Related to patient concern of infertility, dicussed fertile window, daily intercourse during fertile window, ovulation predictor kits, changes in cervical mucus to aid in detection of ovulation. Advised timed intercourse for 6 mos prior to additional workup. Thyroid levels checked and WNL 10/2017. Vitamin D was low 10/2017 and will repeat today as patient has been supplementing. HgbA1c ordered.   -Preconception teaching reviewed including avoidance of tobacco, drugs, alcohol and other harmful substances. Discussed healthy eating and exercise recommendations. Discussed STI prevention.   TT with patient 40 >50% time spent in counseling or coordination of care.     Patricia Martinez RN, SNM    Patient was seen with student who was present for learning.  I personally assessed, examined and made clinical decisions reflected in the documentation. Kenia Chaidez, APRN,CNM      Subjective:       Sarah Ann is a 27 y.o. female who presents for evaluation of an abnormal vaginal odor, pelvic pain, and urinary urgency. Vaginal and urinary symptoms have been present for 1 week. Vaginal symptoms: mild \"fishy\" odor and low pelvic pain which she describes as a constant sore senstation. Contraception: none. Does not use barrier methods. Desires pregnancy. She denies abnormal bleeding, blisters, bumps, burning, discharge, dyspareunia, lesions and local irritation. She denies urinary frequency, pain, or hematuria. Sexually transmitted " "infection risk: possible STD exposure. Sexual partners are: male. Number of partners: one. Has been with partner for 3 years. Reports they are monogamous. Is safe in relationship. Menstrual flow: regular every 28-30 days. Lasting 3 days. LMP 02/06/2018.    Does have history of Chlamydia at age 18 which was treated. Reports history of recurrent BV (per chart review in 2015, 2016, & 2017), but states \"this smells different, not as bad.\" Denies history of yeast infection or urinary tract infection.     Additional concern today is infertility. Patient reports not using contraception or barrier method for past 2.5 years. Reports partner has 3 children with another partner. Reports intercourse 2-3 times per week. Denies tracking ovulation or timing intercourse with ovulation, however reports she knows she is ovulating because she accidentally took an ovulation test thinking it was a pregnancy test, and it showed she was ovulating. Denies family history of infertility.     Was seen at a [facility] clinic on 02/13 where she reports they did a pregnancy test which was negative. Was seen for a cough and prescribed prednisone which she is taking currently. Only other medications taken are Vitamin D and an albuterol inhaler PRN.     Denies tobacco or drug use. Alcohol use approximately once per month.     The following portions of the patient's history were reviewed and updated as appropriate: allergies, current medications, past family history, past medical history, past social history, past surgical history and problem list.      Review of Systems  Pertinent items are noted in HPI.      Objective:   /68 (Patient Site: Right Arm, Patient Position: Sitting, Cuff Size: Adult Regular)  Pulse 76  Temp 98.7  F (37.1  C) (Oral)   Ht 5' 6\" (1.676 m)  Wt 138 lb (62.6 kg)  LMP 02/06/2018  Breastfeeding? No  BMI 22.27 kg/m2    Physical Exam:  General: Pleasant, articulate, well-groomed, well-nourished female.  Not in any " apparent distress.  Skin: no lesions or rashes. Warm to touch.   Abdomen: Soft, nontender, no masses, negative CVAT. Lower pelvis below the level of the iliac crests tender to touch.   Pelvic:   External genitalia: no lesions.  Urethral opening: Without lesions, or tenderness.   Bladder: Without masses, or tenderness.  Vagina: Pink, rugated, normal-appearing discharge. Wet prep and CT/GC obtained.  Cervix: pink, smooth, no lesions.    Bimanual: Patient reported discomfort with insertion of one gloved finger and requested that exam be stopped. Unable to assess for CMT.
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
## 2021-06-16 NOTE — PROGRESS NOTES
HISTORY OF PRESENT ILLNESS  Patient reports that she has been coughing for 8 months.  For the last month the cough has been productive.  She has an appointment with allergy coming up.  She reports that her nose is really swollen.  It seems to be worse in the morning and hard to breath through.  She just started fluticasone yesterday.      REVIEW OF SYSTEMS  Review of Systems: a 10-system review was performed. Pertinent positives are noted in the HPI and on a separate scanned document in the chart.    PMH, PSH, FH and SH has documented in the EHR.      EXAM    CONSTITUTIONAL  General Appearance:  Normal, well developed, well nourished, no obvious distress  Ability to Communicate:  communicates appropriately.    HEAD AND FACE  Appearance and Symmetry:  Normal, no scalp or facial scarring or suspicious lesions.  Paranasal sinuses tenderness:  Normal, Paranasal sinuses non tender    EARS  Clinical speech reception threshold:  Normal, able to hear normal speech.  Auricle:  Normal, Auricles without scars, lesions, masses.  External auditory canal:  Normal, External auditory canal normal.  Tympanic membrane:  Normal, Tympanic membranes normal without swelling or erythema.  Tympanic membrane mobility:  Normal, Normal tympanic membrane mobility.    NASAL ENDOSCOPY  The risks and benefits were reviewed with the patient.  The nose was sprayed with lidocaine and phenylephrine.  The following areas were examined:  floor, roof, middle and inferior turbinates, middle and inferior meatus, sphenoethmoidal recess, nasopharynx and septum.  The nasal scope passed without difficulty with the findings noted in the exam.    NOSE (speculum or scope)  Architecture:  Normal, Grossly normal external nasal architecture with no masses or lesions.  Mucosa:  Normal mucosa, No polyps or masses.  Septum:  Left septal deformity with narrowing of the left nose.   Turbinates:  Normal, No turbinate abnormalities    ORAL CAVITY AND OROPHARYNX  Lips:   Normal.  Dental and gingiva:  Normal, No obvious dental or gingival disease.  Mucosa:  Normal, Moist mucous membranes.  Tongue:  Normal, Tongue mobile with no mucosal abnormalities  Hard and soft palate:  Normal, Hard and soft palate without cleft or mucosal lesions.  Oral pharynx:  Normal, Posterior pharynx without lesions or remarkable asymmetry.  Saliva:  Normal, Clear saliva.  Masses:  Normal, No palpable masses or pathologically enlarged lymph nodes.    NECK  Masses/lymph nodes:  Normal, No worrisome neck masses or lymph nodes.  Salivary glands:  Normal, Parotid and submandibular glands.  Trachea and larynx position:  Normal, Trachea and larynx midline.  Thyroid:  Normal, No thyroid abnormality.  Tenderness:  Normal, No cervical tenderness.  Suppleness:  Normal, Neck supple    NEUROLOGICAL  Speech pattern:  Normal, Proasaic    RESPIRATORY  Symmetry and Respiratory effort:  Normal, Symmetric chest movement and expansion with no increased intercostal retractions or use of accessory muscles.     IMPRESSION  Symptom suggest allergic vs non-allergic rhinitis    RECOMMENDATION  I agree with allergy recommendation.  If allergy evaluation negative then CT sinus.    Nelson Clark MD

## 2021-06-16 NOTE — PROGRESS NOTES
"Chief Complaint   Patient presents with     Follow-up     still feels sob, HA, tightness in chest       HPI: Very pleasant 27-year-old female presents today for follow-up from the emergency department.  She was seen on 2/15/2018 and diagnosed with viral URI and chest wall pain.  The patient is vague about her symptoms but noticed she is been \"coughing for 6 months\".  She also notes occasional wheezing.  She has been seen by her primary physician, Dr. France, who has been treating her for similar symptoms.  She also brings with her prescription from  from urgent care for prednisone 20 mg daily for 5 days.  Importantly, she has not been compliant with taking the prednisone.  She notes that she has been given albuterol inhaler which does not help her complaints, and she had a nebulizer in the emergency room yesterday which did nothing for her she tells me.  Cardiac workup was negative.    ROS: Positive for occasional wheezing, negative for sputum production fever vomiting or diarrhea    SH: The Patient's  reports that she has never smoked. She does not have any smokeless tobacco history on file. She reports that she drinks about 0.6 oz of alcohol per week  She reports that she does not use illicit drugs.     FH: The Patient's family history includes Colon cancer in her maternal grandmother; Diabetes in her maternal grandmother; Fibromyalgia in her mother; Heart disease in her maternal grandfather; Hypertension in her maternal grandmother and mother; Lung cancer in her maternal grandmother; Rheum arthritis in her mother.    Meds:  Sarah has a current medication list which includes the following prescription(s): albuterol, cholecalciferol (vitamin d3), and metronidazole.    O:  /74  Pulse 81  Temp 98.4  F (36.9  C)  Resp 18  Ht 5' 6\" (1.676 m)  Wt 138 lb 1 oz (62.6 kg)  SpO2 99%  BMI 22.28 kg/m2  Constitutional:    --Vitals as above  --No acute distress  Eyes-  --Sclera noninjected  --Lids and " conjunctiva normal  ENT-  --TMs clear  --Sclera noninjected  --Pharynx not erythematous  Neck-  --Neck supple    Lymph-  --No cervical lymphadenopathy  Lungs-  --Clear to Auscultation  Heart-  --Regular rate and rhythm  Skin-  --Pink and dry          A/P:   1. Chronic cough  -The patient has been coughing for 6 months with no relief.  After questioning here there is no evidence of GERD or acid reflux causing us.  She is very frustrated and would like this to be treated.  Because of this will ask her to be seen by ENT  - Ambulatory referral to ENT    2. Asthma  -May be a component of asthma although again symptoms are vague.  I encouraged her to continue on the prednisone already prescribed, and use the albuterol inhaler if she needs it.
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
## 2021-06-18 NOTE — PATIENT INSTRUCTIONS - HE
Patient Instructions by Viktor Hicks PA-C at 3/6/2021 11:50 AM     Author: Viktor Hicks PA-C Service: -- Author Type: Physician Assistant    Filed: 3/6/2021 12:28 PM Encounter Date: 3/6/2021 Status: Signed    : Viktor Hicks PA-C (Physician Assistant)         Patient Education     Bacterial Vaginosis    You have a vaginal infection called bacterial vaginosis (BV). Both good and bad bacteria are present in a healthy vagina. BV occurs when these bacteria get out of balance. The number of bad bacteria increase. And the number of good bacteria decrease. Although BV is associated with sexual activity, it is not a sexually transmitted disease.  BV may or may not cause symptoms. If symptoms do occur, they can include:    Thin, gray, milky-white, or sometimes green discharge    Unpleasant odor or fishy smell    Itching, burning, or pain in or around the vagina  It is not known what causes BV, but certain factors can make the problem more likely. This can include:    Douching    Having sex with a new partner    Having sex with more than one partner  BV will sometimes go away on its own. But treatment is usually recommended. This is because untreated BV can increase the risk of more serious health problems such as:    Pelvic inflammatory disease (PID)     delivery (giving birth to a baby early if youre pregnant)    HIV and certain other sexually transmitted diseases (STDs)    Infection after surgery on the reproductive organs  Home care  General care    BV is most often treated with medicines called antibiotics. These may be given as pills or as a vaginal cream. If antibiotics are prescribed, be sure to use them exactly as directed. Also, be sure to complete all of the medicine, even if your symptoms go away.    Don't douche or having sex during treatment.    If you have sex with a female partner, ask your healthcare provider if she should also be treated.  Prevention    Don't  douche.    Don't have sex. If you do have sex, then take steps to lower your risk:  ? Use condoms when having sex.  ? Limit the number of sexual partners you have.  Follow-up care  Follow up with your healthcare provider, or as advised.  When to seek medical advice  Call your healthcare provider right away if:    You have a fever of 100.4 F (38 C) or higher, or as directed by your provider.    Your symptoms worsen, or they dont go away within a few days of starting treatment.    You have new pain in the lower belly or pelvic region.    You have side effects that bother you or a reaction to the pills or cream youre prescribed.    You or any partners you have sex with have new symptoms, such as a rash, joint pain, or sores.  Date Last Reviewed: 10/1/2017    4674-7115 [employer]. [address] [place] [id number]. All rights reserved. This information is not intended as a substitute for professional medical care. Always follow your healthcare professional's instructions.

## 2021-06-22 NOTE — PATIENT INSTRUCTIONS - HE
Pregnancy: Body Changes   From conception (fertilization) until after the birth of your child, you and your baby will change every day. To help you understand what is happening, we ve outlined how pregnancy begins and some of the changes you may notice.   How Pregnancy Begins   Conception is the union of a sperm and an egg. When it occurs, your baby s genetic makeup is complete, even its sex. Fertilization takes place in the fallopian tube. The fertilized egg then travels down this tube to the uterus (womb). The egg attaches to the lining of the uterus about a week later. There it grows and is nourished.   Your Changing Body   Pregnancy affects almost every part of your body. You may notice some of the following physical and emotional changes:   Your uterus expands outward and upward as your baby grows. You may feel pressure on your bladder, stomach, and other organs.   You may notice skin color changes on your forehead, nose, and cheeks. A dark line may form from your bellybutton down to your pubic area. The skin color around your nipples and thighs may also change.   Pink stretch marks may appear on your abdomen, breasts, or hips.   Your hair may seem thicker. You lose less hair during pregnancy.   You may feel fine one day and weepy the next. This is caused by changes in your body, such as increased hormones (chemicals that affect the function of certain organs and also your moods).  Adapting to Pregnancy: First Trimester   As your body adjusts, you may have to change or limit your daily activities. You ll need more rest. You may also need to use the energy you have more wisely.       Your Changing Body   Almost every part of your body is affected as you adapt to pregnancy. The uterus and cervix will begin to soften right away. You may not look very pregnant during the first three months. But you are likely to have some common signs of early pregnancy:   Nausea   Fatigue   Frequent urination   Mood swings    Bloating of the abdomen   Missed or light periods (first trimester bleeding)   Nipple or breast tenderness, breast swelling  It s Not Too Late to Start Good Habits   What matters most is protecting your baby from this moment on. If you smoke, drink alcohol, or use drugs, now is the time to stop. If you need help, talk with your health care provider.   Smoking increases the risk ofÂ Â stillbirthÂ or having a low-birth-weight baby. If you smoke, quit now.   Alcohol and drugs have been linked with miscarriage, birth defects, intellectual disability, and low birth weight. Do not drink alcohol or take drugs.  Tips to Relieve Nausea   Although nausea can occur at any time of the day, it may be worse in the morning. To help prevent nausea:   Eat small, light meals at frequent intervals.   Get up slowly. Eat a few unsalted crackers before you get out of bed.   Drink water with lemon slices.   Eat an ice popÂ in your favorite flavor.   Ask your health care provider about taking phong or vitamin B6 for nausea and vomiting.   Talk with your health care provider if you take vitamins that upset your stomach.  Work Concerns   The end of the first trimester is a good time to discuss working during pregnancy with your employer. Follow your health care provider s advice if your job requires you to stand for a long time, work with hazardous tools, or even sit at a desk all day. Your workspace, workload, or scheduled hours may need to be adjusted. Perhaps you can change body postures more often or take an extra break.   Advice for Travel   Talk to your health care provider first, but the second trimester may be the best time for any travel. You may be advised to avoid certain trips while you re pregnant. Food and water can be concerns in developing countries. Travel by car is a good choice, as you can stop, get out, and stretch. Bring snacks and water along. Fasten the lap belt below your belly, low over your hips. Also be sure to  "wear the shoulder harness.   Intimacy   Unless your health care provider tells you to, there is no reason to stop having sex while you re pregnant. You or your partner may notice changes in desire. Desire may be less in the first trimester, due to nausea and fatigue. In the second trimester, sex may be very enjoyable. The third trimester can be a challenge comfort-wise. Try different positions and see what s best for you both.   Pregnancy: Your First Trimester Changes   The first trimester is a time of rapid development for your baby. Because your baby is growing so quickly, it is important that you start a healthy lifestyle right away. By the end of the first trimester, your baby has formed all of its major body organs and weighs just over an ounce.       Actual size of baby is 1/4\"    Month 1 (Weeks 1-4)   The placenta (the organ that nourishes your baby) begins to form. The heart and lungs begin to develop. Your baby is about 1/4 inch long by the end of the first month.       Actual size of baby is 1\"    Month 2 (Weeks 5-8)   All of your baby s major body organs form. The face, fingers, toes, ears, and eyes appear. By the end of the month, your baby is about 1 inch long.       Actual size of baby is 4\"    Month 3 (Weeks 9-12)   Your baby can open and close its fists and mouth. The sexual organs begin to form. As the first trimester ends, your baby is about 4 inches long.       Pregnancy: Your Weight   Being a healthy weight is important for both you and your baby. The weight you gain now is not just extra fat. It is also the weight of your baby. And it is the increased blood and fluids to support the baby. A slow, steady rate of gain is best. How much you should gain depends on your weight before getting pregnant. Check with your health care provider to find out what is right for you.   If You Gain Too Much   Gaining too much weight might cause you to feel tired or you could have a harder pregnancy or birth. If " you and your health care provider decide you re gaining too much:   Eat fewer fats and sugars. Instead, eat fruit, vegetables, and whole-grain foods.   Drink plenty of water between meals.   Get at least 20 minutes of light exercise, such as walking, each day.   Don t diet. You might not get enough of the nutrients you or your baby needs.   Keep a diet diary to help you gauge what and how much you are eating .  If You re Not Gaining Enough   If you don t gain enough, your baby could be too small or have health problems. Women tend to gain most of their weight in the second and third trimesters. For now:   Eat many types of foods. Make sure you get enough calcium, protein, and carbohydrates.   Don t skip meals.   Eat healthy snacks.   Pick nutrient-dense, high calorie healthy food like trail mix or protein shakes.   See a dietitian for help.   Talk to your healthcare provider if you have had an eating disorder or problems with certain foods.  Pregnancy: Common Questions   There are plenty of myths and  old wives  tales  surrounding pregnancy. You may need help  fact from fiction. On this sheet, you ll find answers to a few common questions. If you have other questions, talk with your health care provider.   Will Working Harm My Baby?   In most cases, working throughout your pregnancy is not harmful at all. There may be concerns if the job involves dangerous machinery or chemicals, lifting, or standing for very long periods of time. Talk to your health care provider and employer about your particular job and pregnancy.   Why Can t I Change the Cat Litter Box?   Cats carry a disease called toxoplasmosis. In adult humans, it shows up as a mild infection of the blood and organs. If you are infected during pregnancy, the baby s brain and eyes could be damaged. To be safe, have someone else change the litter. If you must handle it, wear a paper mask over your nose and mouth. Also, wear gloves and wash your hands  afterward.   Which Medications Are Safe?   No prescription or over-the-counter drug is safe for everyone all of the time. But sometimes medications are needed. Be sure your health care provider knows you are pregnant. Then use only the medications he or she advises you to take.   Is It True That I Can Overheat My Baby?   Yes. To avoid making your baby too warm:   Don t sit in a jacuzzi. A long, warm bath is fine, but not in water over 100Â F.   Exercise less intensely if you feel fatigued. Base your workout on how you feel, not your heart rate. Heart rates aren t a good way to measure effort during pregnancy.  Can I Lift and Carry Safely?   Yes, if your health care provider doesn t tell you otherwise. Learn to lift and carry safely to avoid injury and reduce back pain during pregnancy. To protect your back:   Bend at the knees to bring the load nearer.   Get a good . Test the weight of the load.   Tighten your abdomen. Exhale as you lift.   Lift with your legs, not with your back.   Carry the load close to your body.   Hold the load so you can see where you are going.  What If I Get Sick?   Most women get sick at least once during pregnancy. Talk with your health care provider if you do. Most likely it will not affect your pregnancy. Get plenty of rest and fluids, and eat what you can. Talk to your health care provider before taking any medications.   [facility] Nurse Midwives - Contact information:   Appointment line and to get a hold of CNM in clinic Monday-Friday 8 am - 5 pm: [phone]   CNM on call answering service: [phone]. Specify hospital of choice and leave a brief message for CNM;  will then page CNM who is on call at your specified hospital and you should receive a call back with 15 minutes. Be sure that your ringer is audible and that you can accept blocked calls so that we can get back in touch with you! This number should bereservedfor urgent needs if during the day, after  hours, weekends, holidays   HEALTHY PREGNANCY CARE: 10-14 WEEKS PREGNANT   By weeks 10 to 14 of your pregnancy, the placenta has formed inside your uterus. It may be possible to hear your baby's heartbeat with a doppler ultrasound device. Your baby's eyes can and do move. The arms and legs can bend.   The second trimester genetic screening tests for Down's Syndrome, Trisomy 18, and neural tube defects (which are known collectively as a quad screen) are done at 15 to 22 weeks. It's your choice whether to have these tests. You and your partner can talk to your midwife or physician about birth defects tests.   Consider breastfeeding for the healthiest way to feed your baby. Ask your midwife or physician for more information.   As your center of gravity and weight changes, use good body mechanics when changing positions and lifting. For example, use a straight back and your legs for support when lifting instead of bending over. Maintain good posture to prevent straining your muscles. Now is a good time to continue or restart your exercise program. Walking 30-60 minutes daily is an excellent way to keep fit. Yoga and swimming also offer many benefits.   The nausea and fatigue of early pregnancy have usually started to let up, so this is a good time to focus on nutrition. Consider attending a nutrition class. A healthy diet includes about 60 grams of protein each day (3-4 servings of dairy, 2-3 servings of meat/fish/poultry/nuts), 4-6 servings of whole grain foods, and 5-6 servings of fruits and vegetables. Remember to drink 6-8 glasses of water daily.   Watch for warning signs, such as   vaginal bleeding   fluid leaking from your vagina   severe abdominal pain   nausea and vomiting more than 4-5 times a day, or if you are unable to keep anything down   fever more than 100.4 degrees F.   [facility] Nurse Midwives - Contact information:   Appointment line and to get a hold of CNLAYLA in clinic Monday-Friday 8 am - 5 pm: [phone]  [phone]. There are some clinics with early start times (1st appointment 7:20 am) and others with evening hours (last appointment 6:20 pm). Most are typically open from 8 am to 5 pm.   CNM on call answering service: [phone]. Specify your hospital of choice and leave a brief message for CNM;  will then page CNM who is on call at your specified hospital and you should receive a call back with 15 minutes. Be sure that your ringer is audible and that you can accept blocked calls so that we can get back in touch with you! This number should be reserved for urgent needs if during the day, before 8 am, after 5 pm, weekends, holidays.   RESOURCES   You can refer to the Starting Out Right book or find it online at [email or web address]/images/stories/[email or web address]-Starting-Out-Right.pdf or http://www.[email or web address]/images/stories/flipbooks/[facility]-starting-out-right/[facility]-starting-out-right.html#p=8   You can sign up for a weekly parenting e-mail that gives support, tips and advice from health care professionals that starts with pregnancy and continues through the toddler years. To register, go to [email or web address] at any time during your pregnancy.   American College of Nurse-Midwives (ACNM) http://www.midwife.org/; look at the informational handouts at http://www.midwife.org/Share-With-Women   www.mymidwife.org   American pregnancy association - http://americanpregnancy.org   March of Dimes www.marchofdimes.com   FDA - Nutrition www.mypyramid.gov Under \"For Consumers,\" click on \"pregnant and breastfeeding women.\"   Vaccines : Centers for Disease Control and Prevention (CDC) http://www.cdc.gov/vaccines/   Centering Pregnancy (group prenatal care option): http://centeringhealthcare.org   Baby Center http://www.babycenter.com/   [facility] www.[facility].com   When researching information on the web, question the validity of websites. The domains .gov, .edu and.org tend to be more " reliable information. If there are a lot of advertisements, be cautious of the information provided. Stay away from blogs and chat rooms please!   Nutrition & supplements:   CONSIDER ATTENDING OUT FREE NUTRITION CLASS (FIRST Saturday of each month from 9-11 am). Call the midwife appointment line for details and to schedule   Prenatal vitamin (those with 600-1000 mcg folic acid and 27 mg of iron are enough). Take with food or Juice   4-5 servings of dairy or other calcium rich foods (fish, leafy greens, soy) per day - if not, take 500-1000 mg additional calcium (Tums, pills, chews). Take with dairy   Vitamin D3 2540-3693 IU geltab daily. Take with fattiest meal. Look for fortified foods also (Dairy, Juice)   2-3 (4) oz servings of fish, seafood, nuts (walnuts & almonds), oils, avocado per week - if not, take Omega 3 Fatty acids: DHA & RADHA 4148-6384 mg per day. Other names: cod liver oil, fish oil. Take with fattiest meal. Some prenatals have DHA, but typically not a sufficient dose.   Fish: Do not eat shark, swordfish, melissa mackerel, or tilefish when you are pregnant or breastfeeding. They contain high levels of mercury. Limit white (albacore) tuna to no more than 6 ounces per week

## 2021-06-22 NOTE — PROGRESS NOTES
Called pt and gave results of US.  Apologized for mistaking fibroid for embryo on courtesy US in clinic.  Pt understanding as she reports seeing it too.  Explained this is either going to be a very early pregnancy that will develop normally or will be a nonviable pregnancy. Recommend follow up US in 2 weeks.  She agrees to this.  Order placed.  Pt will notify us if she has any vaginal bleeding.  She is RH negative.

## 2021-06-22 NOTE — PROGRESS NOTES
Assessment:     1.  28-year-old  4 para 0030 with IUP at 6 weeks 1 day by ultrasound today  2.  Large left ovarian cyst per ultrasound  3.  Intramural fibroid per ultrasound  4.  Nabothian cyst per ultrasound  5.  Rh negative WITHOUT vaginal bleeding  6.  Nausea of pregnancy without vomiting     Plan:      1. Assured patient Rhophylac is not indicated at this time.  2. Blood tests: None indicated today.  Ultrasound performed earlier today.  3. Comfort measures regarding nausea of pregnancy.  Written information given.  4.  Reviewed ultrasound as well as NEGATIVE GC/CT culture and wet prep done on 2018.  5.  Referred to OB/GYN for large left ovarian cyst.  6.  RTC in 4-6 weeks for initial OB visit between 10 and 12 weeks EGA or sooner as needed.      Total time spent with patient: 25 minutes all of which was spent counseling and coordinating care    Subjective:      Sarah Ann is a 28 y.o. female who presents for ED follow-up from today.  She believes she needs Rhophylac for the lower abdominal pain she experienced earlier.  Denies vaginal bleeding.  Reports nausea of pregnancy without vomiting.  Only able to eat about 1 meal a day, but drinking regularly.  Voiding regularly.    Gynecologic History  No LMP recorded (lmp unknown). Patient is pregnant.      OB History    Para Term  AB Living   4 0     3 0   SAB TAB Ectopic Multiple Live Births   1 2            # Outcome Date GA Lbr Cesar/2nd Weight Sex Delivery Anes PTL Lv   4 Current            3 SAB 14     SAB         Birth Comments: Missed , 6w6d, D&C   2 TAB 08     TAB         Birth Comments: D&E   1 TAB 08     TAB         Birth Comments: First trimester surgical           Current Outpatient Medications   Medication Sig Dispense Refill     prenat.vits,jax,min-iron-folic (PRENATAL VITAMIN) Tab Take by mouth daily.       cholecalciferol, vitamin D3, 5,000 unit Tab Take 1 tablet by mouth daily.       No  current facility-administered medications for this visit.      Past Medical History:   Diagnosis Date     Benign breast lumps 3/30/2015     Chlamydia     age 15     Depression     no meds or therapy     Female infertility     trying to get pregnant for 4 years, no infertility work up     Ovarian cyst      Rh incompatibility      Varicella     childhood, 13     Past Surgical History:   Procedure Laterality Date     DILATION AND CURETTAGE OF UTERUS N/A 12/16/2014    Procedure: DILATION AND CURETTAGE SUCTION;  Surgeon: Florencio Bonilla MD;  Location: [facility] OR;  Service:      WISDOM TOOTH EXTRACTION  2015     Latex  Family History   Problem Relation Age of Onset     Rheum arthritis Mother      Hypertension Mother      Fibromyalgia Mother      Colon cancer Maternal Grandmother      Diabetes Maternal Grandmother      Lung cancer Maternal Grandmother      Hypertension Maternal Grandmother      Heart disease Maternal Grandfather      No Medical Problems Sister      No Medical Problems Brother      Diabetes Paternal Grandmother      No Medical Problems Brother      No Medical Problems Brother      No Medical Problems Sister      Heart murmur Sister      No Medical Problems Sister      Social History     Socioeconomic History     Marital status: Single     Spouse name: Not on file     Number of children: Not on file     Years of education: Not on file     Highest education level: Not on file   Social Needs     Financial resource strain: Not on file     Food insecurity - worry: Not on file     Food insecurity - inability: Not on file     Transportation needs - medical: Not on file     Transportation needs - non-medical: Not on file   Occupational History     Not on file   Tobacco Use     Smoking status: Never Smoker     Smokeless tobacco: Never Used   Substance and Sexual Activity     Alcohol use: Yes     Alcohol/week: 0.6 oz     Types: 1 Glasses of wine per week     Comment: 1 every couple of months     Drug use: No  "    Sexual activity: Yes     Partners: Male     Comment: has not been active in the last 5 months   Other Topics Concern     Not on file   Social History Narrative    Lives with family.        Review of Systems  General:  Denies problem  Gastrointestinal:  nausea  Genitourinary: denies problems  Musculoskeletal:  Denies problem  Skin: Denies problem  Neurologic:Lightheadedness  Psychiatric: denies problems  Endocrine: denies problems        Objective:         Vitals:    12/20/18 1316   BP: 108/60   Pulse: 88   Weight: 145 lb (65.8 kg)   Height: 5' 6\" (1.676 m)     12/5/2018: GC/CT both NEGATIVE, and wet prep NEGATIVE    Physical Exam:  General Appearance: Alert, cooperative, no distress, appears stated age  US OB < 14 Weeks With Transvaginal (Order [id number])   Imaging   Date: 12/20/2018 Department: [facility] Released By/Authorizing: Eduardo Bergman PA-C (auto-released)   Study Result     US OB < 14 WEEKS WITH TRANSVAGINAL  12/20/2018 10:41 AM     INDICATION: Abd pain, 8 weeks pregnant  TECHNIQUE: Transabdominal scans were performed. Endovaginal ultrasound was performed to better visualize the embryo.  COMPARISON: None.     FINDINGS:  UTERUS: Single intrauterine pregnancy. There is a 1.6 cm intramural fibroid arising from the left posterior mid uterine body. A small (1.5 x 0.9 cm) complex cyst located in the cervix contains echogenic debris but no blood flow.  CRL measures 0.4 cm, equals 6 weeks 1 day gestation  EDC: 8/14/2019.  FETAL HEART RATE: 119 bpm.  AMNIOTIC FLUID: Normal.  PLACENTA: Not yet formed.  Yolk sac: Normal.  No abnormalities.     RIGHT OVARY: Normal.  LEFT OVARY: There is a large (4.8 x 4.6 x 3.7 cm) simple left ovarian cyst.     IMPRESSION:   CONCLUSION:   1.  Single living intrauterine pregnancy at 6 weeks 1 day gestation.  2.  EDC is 8/14/2019.  3.  Small uterine fibroid.  4.  Large left ovarian corpus luteum cyst.  5.  Small complex nabothian cyst.       "
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
## 2021-06-22 NOTE — PROGRESS NOTES
Confirmation of Pregnancy Visit      Subjective:   Sarah is a 28 y.o. y.o. female who presents to clinic today for a confirmation of pregnancy visit.  She had a positive pregnancy test last night, but is unsure if it was accurate.  She is unsure of her last menstrual period, but care everywhere visit with An states her last menstrual period was October 9.  She knows it was more than a month ago.  If this date is correct, she would be 8 weeks 1 day today.  She reports she has been actively trying to get pregnant for the last 4 years and did not think she was able to have a pregnancy after her last missed AB where she had a D&C.  She did note some brownish type discharge last week for a few days but has not had any since.  She denies any vaginal irritation, burning, odor, and no symptoms of urinary tract infection.  She does report bilateral intermittent sharp pain in her low pelvis.  We discussed differentials of vaginal infection, miscarriage, intercourse, and uterine stretching as possible causes for brown spotting and pelvic pain in pregnancy.  Recommended formal dating ultrasound.  Reviewed comfort measures for morning sickness in pregnancy.    No LMP recorded (lmp unknown)..  Her menstrual cycle is typically monthly.  Last period was normal.  This was a planned pregnancy.  She is teary finding out that the pregnancy test was positive in clinic today.  Was not on contraception;   Home UPT positive on 12/4/2018  Current symptoms also include: fatigue, nausea and positive home pregnancy test.        PSFH:  Electronic Health Record updated for Medications, Allergies, Medical History, Surgical History and Family History.    A 12 point comprehensive review of systems was negative except as noted.        Objective:   , articulate, well-groomed, well nourished female.  Not in any apparent distress.      Physical Examination: General appearance - alert, well appearing, and in no distress  Mental status - alert,  oriented to person, place, and time  Abdomen - soft, nontender, nondistended, no masses or organomegaly  Pelvic - VULVA: normal appearing vulva with no masses, tenderness or lesions, VAGINA: normal appearing vagina with normal color, no lesions, vaginal discharge -mix of white, creamy discharge and milky and thin discharge, CERVIX: ectropion noted, cervical motion tenderness absent, UTERUS: enlarged to 8 week's size, ADNEXA: normal adnexa in size, nontender and no masses      Urine pregnancy test returns: Positive      Assessment/Plan:       1. Pregnancy confirmation. UPT in clinic positive.  2. Discussed maternity care options at [facility]- philosophy, care models, and locations.   3. Early pregnancy education reviewed and written information given including: first trimester discomforts and relief measures. Nutrition principles in early pregnancy briefly discussed and encouraged exercise plan, minimizing caffeine, avoiding alcohol/drugs, checking with healthcare providers about RX/OTC medications before using them (Tylenol and Benadryl okay to use), emotional self-care/support, and general self-care. Encouraged PNV with folic acid, vitamin D, and DHA supplements.  4. Dating ultrasound discussed. Accepts referral to [facility] to confirm pregnancy dating. Discuss transvaginal u/s if <12 wks GA.  5. Pt encouraged to follow-up for IOB visit between 10 and 12 weeks.   6. Reviewed warning signs to call clinic if cramping /bleeding/spotting.  Patient is Rh-.  7.  Wet prep and Chlamydia gonorrhea screening today    TT spent with patient, 40 mns, all of which was spent in counseling or coordination of care  Kenia Javier CNM

## 2021-06-23 NOTE — TELEPHONE ENCOUNTER
States she has a lot of mucus going down her throat from her nose. Makes her vomit when it goes down.     10 weeks pregnant.     Nose is runny  Going on for 6 weeks. Thinks due to pregnancy   Wonders if anything she can take.    No sore throat  No cough  No ear   No fever  No shortness of breath    OK to leave detailed message    Rae [address] [place] [address]      Reason for Disposition    [1] Nasal discharge AND [2] present > 10 days    Protocols used: SINUS PAIN OR CONGESTION-A-    Paged Dr. Barros    Per Dr. Barros:   Saline Rinses  Humidifier  Antihistamine (claritin, zyrtec) short term  Nasal Atrovent? Cat B    Patient notified and is agreeable. Medication sent to preferred pharmacy. Patient will call back with further questions or concerns or worsening symptoms.     Kathleen Amaya RN [facility]

## 2021-06-23 NOTE — PROGRESS NOTES
"S:  Patient checks in 8 minutes late for problem visit.  Patient here for a problem visit at 11w3d r/t nausea/vomting.  This topic was covered extensively in her IOB on 1/7/19 - at that point she had not tried any complementary therapies, or Vitamin B6/Unisom to help with the nausea.  Her weight loss at that point was only 4 lbs total (3%), and her UA was negative for ketones or signs of dehydration.  She had an ED visit on 1/14 where she received fluids and was prescribed Reglan.    Also has sent My Chart message regarding ED dx of \"hyperemesis\", and wondering about disability forms.      Today, she presents to discuss her nausea/vomiting.  She has not tried the Vitamin B6/Unisom combination or the Reglan in the ED - stating \"I tried it once, it didn't work, I don't want my baby to have that.\"  She states smells really get to her.  She generally vomits 2x/day, though last night it was 4x.      She would like disability paperwork updated to state she can miss work for \"flare-ups.\"    O:  /64 (Patient Site: Right Arm, Patient Position: Sitting, Cuff Size: Adult Regular)   Pulse 90   Wt 138 lb (62.6 kg)   LMP  (LMP Unknown)   SpO2 98%   BMI 22.27 kg/m    Contains abnormal data Urinalysis-UC if Indicated   Order: [id number]   Status:  Final result   Visible to patient:  No (Not Released)   Next appt:  None   Dx:  Vomiting or nausea of pregnancy    Ref Range & Units 1/23/19 1608   Color, UA Colorless, Yellow, Straw, Light Yellow Yellow    Clarity, UA Clear Clear    Glucose, UA Negative Negative    Bilirubin, UA Negative Negative    Ketones, UA Negative Negative    Specific Gravity, UA 1.005 - 1.030 1.020    Blood, UA Negative Negative    pH, UA 5.0 - 8.0 7.5    Protein, UA Negative mg/dL 30 mg/dL Abnormal     Urobilinogen, UA 0.2 E.U./dL, 1.0 E.U./dL 0.2 E.U./dL    Nitrite, UA Negative Negative    Leukocytes, UA Negative Negative    Bacteria, UA None Seen hpf None Seen    RBC, UA None Seen, 0-2 hpf None Seen  "   WBC, UA None Seen, 0-5 hpf None Seen    Squam Epithel, UA None Seen, 0-5 lpf None Seen    Amorphous, UA None Seen Moderate Abnormal     Resulting Agency  [facility]      Narrative           7 lb weight loss overall  Declines CMP today.  FHTs 170s  General appearance:  Pleasant    A:  27 yo  IUP @ 11w3d with mild hyperemesis  5% weight loss    P:  Discussed extensively again the need to trial Vitamin B6/Unisom (instructions in Wrap-Up) - educated on safety in pregnancy.  Also educated on Reglan, then Zofran and that Zofran is found to not have the concern of cardiac malformations anymore.  Reviewed that I cannot excuse her to be off of work if she is not trying any of the therapies we recommend.  She will try B6/Unisom, and sounds open to Zofran 4mg three times a day prn if B6/Unisom does not work.    She also accepts IV infusion therapy - but declines IV Zofran.  Referral placed.    Declined CMP today.  Reassurance provided.  Discussed benefits of infusion therapy and treating her nausea/vomiting of pregnancy.      CLARITZA Vences, CAROL  2019  5:11 PM    Total time spent with patient 20 minutes, >50% counseling, education and coordination of care.

## 2021-06-24 NOTE — PROGRESS NOTES
Received message from scheduling that [facility] went to [facility] today for [facility].  PATRICK Zelaya called and stated that patient left due to the smells there and did not get IV fluids.  Patient has seen an [facility] provider about her nausea as well per Care Everywhere.

## 2021-06-24 NOTE — PROGRESS NOTES
Assessment and Plan    1. Hyperemesis gravidarum, antepartum  Sarah would like to get her care at [facility]. She is very worried about taking medications while she was pregnant. While  I commended Sarah her for her concern about medications and said it was appropriate to bring this up before she started new medications,  I assured her that midwives address such concerns all the time, have the complete trust of providers here, would not recommend anything that would harm the baby, and had recommended  usual treatment and standard of care.     2. Nasal congestion  Trial of   - fluticasone (VERAMYST) 27.5 mcg/actuation nasal spray; 2 sprays into each nostril daily.  Dispense: 10 g; Refill: 2    I spent  25 minutes spent with this patient, over half in counseling       Patient Instructions   I agree with the midwives you saw at [facility], who wrote:  She will try B6/Unisom, and sounds open to Zofran 4mg three times a day prn if B6/Unisom does not work.      You can take vitamin B6 10 mg three times a day  You can add to this 'unisom' which is doxylamine - make ayad what you get is  Doxylamine -you can talk half a pill in the evening, and in the morning if it doesn't make you too sleepy      Return for follow up with midwives.    Marilu France MD     -------------------------------------------    Chief Complaint   Patient presents with     Nausea     pt states feeling nauseas all day long, throwing up all the time. pt states 16 weeks pregnant      Epistaxis     pt states nose bleeds      Nasal Congestion     pt states lots of nasal drip     Sarah come to see me for nausea and vomiting associated with pregnancy.     - she has established prenatal care with the midwives and was seen on 1/7/19   - she went to the [facility] ED with hyperemesis on 1/14 and was treated with IV fluids and Reglan with good improvement; she appreciated the use of peppermint and citrus to make her feel better.   - she had a  follow up visit with the midwives on 1/23/19 - who recommended B6/doxylamine and IV fluid infusion. She was did not want to take the medications and she went to the infusion center for fluids, but left because she could not tolerate the smells there.     - She was seen again for hyperemesis by [facility] on 2/1/19 who worked her up for UTI and advised she establish prenatal care.   - She was seen at [facility] on 2/27/19 for abdominal pain and I improved on IV fluids.     Sarah says she has not wanted to take medications because she is worried that they will hurt her baby. She would like to know if I have any advice for alternate ways she can address her nausea and vomiting    She says she vomits every day, everything smells bad.  She has just started to be able to hold foods down, after two weeks of hardly being able to eat anything, during which time she also lost some weight. Feels dizzy and weak and body aches. She also has  mucous going down nose and throat - has been going on for 6 weeks.  No face pain or stuffy sinuses. Humidifier is not working      Studies from ED visit at [facility] 2/27/19  Imaging:  US OB Limited Any TRI SINGLE TA AND TV:  Single living intrauterine pregnancy in transverse position.  Report per radiology.     Laboratory:  CBC: hemoglobin 9.9 (low), hematocrit 30.6 (low), otherwise WNL (WBC 6.8, )   BMP: WNL (Creatinine 0.57)   LFT: indirect bilirubin 0.1 (low), ALK phosphatase 44 (low), otherwise WNL     Urinalysis: Urine Specific gravity >=1.030, Increased urobilinogen, 30 proteins, Trace ketones, Trace leukocyte esterase, otherwise normal.     Influenza antigen: Negative for Influenza A antigen; Negative for Influenza B antigen       Social: she works at a [employer], and does a lot of running back and forth        Current Outpatient Medications on File Prior to Visit   Medication Sig Dispense Refill     cholecalciferol, vitamin D3, 5,000 unit Tab Take 1 tablet by mouth  daily.       ipratropium (ATROVENT) 42 mcg (0.06 %) nasal spray 2 sprays into each nostril 4 (four) times a day. 15 mL 2     metoclopramide (REGLAN) 5 MG tablet Take 1 tablet (5 mg total) by mouth 3 (three) times a day as needed for nausea. 12 tablet 0     prenat.vits,jax,min-iron-folic (PRENATAL VITAMIN) Tab Take by mouth daily.       No current facility-administered medications on file prior to visit.            The history section was last reviewed by Medhat Serrano CMA on Mar 7, 2019.    Social History     Tobacco Use   Smoking Status Never Smoker   Smokeless Tobacco Never Used       Social History     Substance and Sexual Activity   Alcohol Use Yes     Alcohol/week: 0.6 oz     Types: 1 Glasses of wine per week    Comment: 1 every couple of months         Vitals:    03/07/19 1147   BP: 122/70   Pulse: 89   Resp: 16   SpO2: 99%     Body mass index is 23.16 kg/m .     EXAM:    General appearance - tired appearing, no physical distress  Mental status - worried but otherwise normal  Ears - bilateral TM's and external ear canals normal  Mouth - mucous membranes moist, pharynx normal without lesions  Chest - clear to auscultation, no wheezes, rales or rhonchi, symmetric air entry  Heart - normal rate, regular rhythm, normal S1, S2, no murmurs, rubs, clicks or gallops  Abdomen - soft,  nondistended, no masses or organomegaly; mild diffuse tenderness

## 2021-06-24 NOTE — TELEPHONE ENCOUNTER
Pt has appt set up with midwife at the [facility]. Any further appts with midwives must be made with the midwife scheduling line.

## 2021-06-24 NOTE — PATIENT INSTRUCTIONS - HE
I agree with the midwives you saw at [facility], who wrote:  She will try B6/Unisom, and sounds open to Zofran 4mg three times a day prn if B6/Unisom does not work.      You can take vitamin B6 10 mg three times a day  You can add to this 'unisom' which is doxylamine - make ayad what you get is  Doxylamine -you can talk half a pill in the evening, and in the morning if it doesn't make you too sleepy

## 2021-06-24 NOTE — TELEPHONE ENCOUNTER
Left message to call back for: Pt  Information to relay to patient:  Please assist pt in making an appt with midwives for OB care.  Pt saw them for IOB on 1/7/19 and foreign OB appt on 1/23/19.  Thank you.

## 2021-06-24 NOTE — TELEPHONE ENCOUNTER
----- Message from Marilu France MD sent at 3/11/2019  4:06 PM CDT -----  Support staff - please call her and remind her to schedule prenatal follow up here (I did give her the magnet with information)

## 2021-06-25 NOTE — TELEPHONE ENCOUNTER
Called patient. Last UA showed no blood. She was menstruating during her previous UA. No further f/u necessary. If more questions or symptoms return see PCP

## 2021-06-25 NOTE — TELEPHONE ENCOUNTER
Pt called in states she urine a lot today.  The symptom started at  List 4 months ago.  The symptom comeback this week.  No pain with urination.  Pt didn't know the cause of the symptom.  Pt states she has history of protein in her urine.  No fever, no lower back pain,   Pt is not pregnant.  The disposition is to be seen to be seen with in 2 weeks.  Care advice given per protocol.  Patient agrees with care advice given.   Agreed to call back if he has additional symptoms or questions.      Yanick Buckley RN, [facility] Triage/Med Refill 6/7/2021 5:02 PM        Reason for Disposition    All other urine symptoms    Additional Information    Negative: Shock suspected (e.g., cold/pale/clammy skin, too weak to stand, low BP, rapid pulse)    Negative: Sounds like a life-threatening emergency to the triager    Negative: Followed a genital area injury    Negative: Followed a genital area injury (penis, scrotum)    Negative: Vaginal discharge    Negative: Pus (white, yellow) or bloody discharge from end of penis    Negative: [1] Taking antibiotic for urinary tract infection (UTI) AND [2] female    Negative: [1] Taking antibiotic for urinary tract infection (UTI) AND [2] male    Negative: [1] Discomfort (pain, burning or stinging) when passing urine AND [2] pregnant    Negative: [1] Discomfort (pain, burning or stinging) when passing urine AND [2] postpartum (from 0 to 6 weeks after delivery)    Negative: [1] Discomfort (pain, burning or stinging) when passing urine AND [2] female    Negative: [1] Discomfort (pain, burning or stinging) when passing urine AND [2] male    Negative: Pain or itching in the vulvar area    Negative: Pain in scrotum is main symptom    Negative: Blood in the urine is main symptom    Negative: Symptoms arising from use of a urinary catheter (Alejandra or Coude)    Negative: [1] Unable to urinate (or only a few drops) > 4 hours AND     [2] bladder feels very full (e.g., palpable bladder or strong urge  to urinate)    Negative: [1] Decreased urination and [2] drinking very little AND [2] dehydration suspected (e.g., dark urine, no urine > 12 hours, very dry mouth, very lightheaded)    Negative: Patient sounds very sick or weak to the triager    Negative: [1] Can't control passage of urine (i.e., urinary incontinence) AND [2] new onset (< 2 weeks) or worsening    Negative: Side (flank) or lower back pain present    Negative: Fever > 100.4 F (38.0 C)    Negative: Urinating more frequently than usual (i.e., frequency)    Negative: Bad or foul-smelling urine    Negative: Has to get out of bed to urinate > 2 times a night (i.e., nocturia)    Negative: Dribbling (losing urine) just after finishing urination (i.e., post-void dribbling)    Negative: Urination is difficult to start (i.e., hesitancy) or straining    Negative: [1] Can't control passage of urine (i.e., urinary incontinence, wetting self) AND [2] present > 2 weeks    Protocols used: URINARY SYMPTOMS-A-AH

## 2021-06-25 NOTE — TELEPHONE ENCOUNTER
Who is calling:  Sarah (patient)  Reason for Call:  Would like follow up on test results weeks back on blood in urine please.  Date of last appointment with primary care: 2/22/20  Okay to leave a detailed message: Yes

## 2021-06-26 NOTE — PROGRESS NOTES
Assessment & Plan     Fatigue, unspecified type  - HM2(CBC w/o Differential)  - Basic Metabolic Panel    Pain in finger of both hands  - Antinuclear Antibodies Screen (SELENE)  - C-Reactive Protein(CRP)  - Rheumatoid Factor Quant  - Vitamin D, Total (25-Hydroxy)    Urinary frequency  - Urinalysis-UC if Indicated  I did discuss with her the potential causes as well differential diagnosis.  But will go ahead and get labs for her as noted above.  Will inform her of the results.  But in the meantime she can make sure to increase her fluid intake, she should also make sure to take some ibuprofen or Tylenol as needed for the pain.  It is possible that it is caused by the change in weather being very hot at this time.  I will let her know the lab results that she has.  No follow-ups on file.    Ange Palacios MD  [facility]   Sarah Ann is 30 y.o. and presents today for the following health issues   HPI   She comes in with concerns of having been getting symptoms that include fatigue and tiredness that has been going on for some days to 2 weeks now.  She also notices that she has some swelling to the fingers and hand as well as the feet.  This is intermittent.  She is also noticing that she is having an increasing amount of urination.  She noted no nausea no vomiting.  Noted no increase in her weight or weight reduction within a small amount of time.  There is a family history of diabetes and she is worried about that.  She denied any headaches.  Noted no pain to the extremities but does have some numbness around the medial aspect of the right big toe.  She denied any other musculoskeletal pain.    Past Medical History:   Diagnosis Date     Anemia during pregnancy in third trimester 7/2/2019     Benign breast lumps 3/30/2015     Chlamydia     age 15     Depression     no meds or therapy     Female infertility     trying to get pregnant for 4 years, no  infertility work up     Ovarian cyst      Rh incompatibility      Varicella     childhood, 13     Past Surgical History:   Procedure Laterality Date     DILATION AND CURETTAGE OF UTERUS N/A 12/16/2014    Procedure: DILATION AND CURETTAGE SUCTION;  Surgeon: Florencio Bonilla MD;  Location: [facility];  Service:      WISDOM TOOTH EXTRACTION  2015     Social History     Socioeconomic History     Marital status: Single     Spouse name: None     Number of children: None     Years of education: None     Highest education level: None   Occupational History     None   Social Needs     Financial resource strain: None     Food insecurity     Worry: None     Inability: None     Transportation needs     Medical: None     Non-medical: None   Tobacco Use     Smoking status: Never Smoker     Smokeless tobacco: Never Used   Substance and Sexual Activity     Alcohol use: Yes     Alcohol/week: 1.0 standard drinks     Types: 1 Glasses of wine per week     Comment: 1 every couple of months     Drug use: No     Sexual activity: Yes     Partners: Male     Comment: has not been active in the last 5 months   Lifestyle     Physical activity     Days per week: None     Minutes per session: None     Stress: None   Relationships     Social connections     Talks on phone: None     Gets together: None     Attends Mu-ism service: None     Active member of club or organization: None     Attends meetings of clubs or organizations: None     Relationship status: None     Intimate partner violence     Fear of current or ex partner: None     Emotionally abused: None     Physically abused: None     Forced sexual activity: None   Other Topics Concern     None   Social History Narrative    Lives with family.      Family History   Problem Relation Age of Onset     Rheum arthritis Mother      Hypertension Mother      Fibromyalgia Mother      Colon cancer Maternal Grandmother      Diabetes Maternal Grandmother      Lung cancer Maternal Grandmother       Hypertension Maternal Grandmother      Heart disease Maternal Grandfather      No Medical Problems Sister      No Medical Problems Brother      Diabetes Paternal Grandmother      No Medical Problems Brother      No Medical Problems Brother      No Medical Problems Sister      Heart murmur Sister      No Medical Problems Sister      Allergies   Allergen Reactions     Latex Rash     Current Outpatient Medications   Medication Sig Dispense Refill     ferrous sulfate 325 (65 FE) MG tablet Take 1 tablet (325 mg total) by mouth 2 (two) times a day with meals. 90 tablet 0     fluticasone propionate (FLONASE) 50 mcg/actuation nasal spray Apply 1 spray into each nostril daily.       prenat.vits,jax,min-iron-folic (PRENATAL VITAMIN) Tab Take by mouth daily.       No current facility-administered medications for this visit.          Review of Systems   Constitutional: Positive for fatigue. Negative for appetite change and fever.   HENT: Negative.    Respiratory: Negative for cough and chest tightness.    Genitourinary: Negative for flank pain.   Musculoskeletal: Negative for gait problem and joint swelling.   Neurological: Positive for light-headedness and headaches. Negative for weakness.           Objective    /64 (Patient Site: Left Arm, Patient Position: Sitting, Cuff Size: Adult Regular)   Pulse 92   Wt 149 lb 14.4 oz (68 kg)   LMP 05/27/2021 (Approximate)   BMI 24.19 kg/m    Body mass index is 24.19 kg/m .  Physical Exam   Constitutional: She appears well-developed. No distress.   Cardiovascular: Normal rate and regular rhythm.   Pulmonary/Chest: Effort normal and breath sounds normal.   Musculoskeletal: Normal range of motion.      Comments: Examination of the fingers as well as the toes were all normal.  There is no notable swellings.   Neurological: She is alert.   Skin: Skin is warm.

## 2021-07-06 VITALS
HEART RATE: 92 BPM | SYSTOLIC BLOOD PRESSURE: 110 MMHG | DIASTOLIC BLOOD PRESSURE: 64 MMHG | BODY MASS INDEX: 24.19 KG/M2 | WEIGHT: 149.9 LBS

## 2021-07-14 PROBLEM — O09.30: Status: RESOLVED | Noted: 2019-07-02 | Resolved: 2019-07-02

## 2021-07-14 PROBLEM — O35.EXX0 PYELECTASIS OF FETUS ON PRENATAL ULTRASOUND: Status: RESOLVED | Noted: 2019-04-30 | Resolved: 2019-07-19

## 2021-07-14 PROBLEM — O09.30 LIMITED PRENATAL CARE: Status: RESOLVED | Noted: 2019-07-02 | Resolved: 2019-09-23

## 2021-07-14 PROBLEM — O34.32 CERVICAL FUNNELING AFFECTING PREGNANCY IN SECOND TRIMESTER: Status: RESOLVED | Noted: 2019-04-17 | Resolved: 2019-09-23

## 2021-07-14 PROBLEM — O26.13 LOW WEIGHT GAIN DURING PREGNANCY IN THIRD TRIMESTER: Status: RESOLVED | Noted: 2019-07-01 | Resolved: 2019-09-23

## 2021-07-26 NOTE — PROGRESS NOTES
Dennis Nicole from inpatient called to inform Dr Sherin Frazier that patient would not be coming for treatment today and will be coming on 7/28/21  She states Dr Karoline Weeks was informed but was not sure who that was and wanted to make sure Dr Sherin Frazier was aware  Patient arrived at approx. 1330.  Brought back to large room, was having a problem with the smells making her more nauseated.  Brought her up to the front private room, still complained of odors and nausea.  Did remove the trash.  I left her there to get supplies to get her IV started and when I returned she was gone.  I did check other areas in case she was sick, but unable to locate.  Notified her practitioner Haily MEDINA, clinic.

## 2021-08-15 ENCOUNTER — HEALTH MAINTENANCE LETTER (OUTPATIENT)
Age: 31
End: 2021-08-15

## 2021-10-10 ENCOUNTER — HEALTH MAINTENANCE LETTER (OUTPATIENT)
Age: 31
End: 2021-10-10

## 2022-02-17 PROBLEM — O09.891 SUPERVISION OF OTHER HIGH RISK PREGNANCIES, FIRST TRIMESTER: Status: RESOLVED | Noted: 2018-12-20 | Resolved: 2019-09-23

## 2022-09-18 ENCOUNTER — HEALTH MAINTENANCE LETTER (OUTPATIENT)
Age: 32
End: 2022-09-18

## 2022-10-19 NOTE — TELEPHONE ENCOUNTER
Problem: At Risk for Falls  Goal: # Verbalizes understanding of fall risk/precautions  Description: Document education using the patient education activity  Outcome: Outcome Met, Continue evaluating goal progress toward completion     Problem: Pain  Goal: #Acceptable pain level achieved/maintained at rest using NRS/Faces  Description: This goal is used for patients who can self-report.  Acceptable means the level is at or below the identified comfort/function goal.  Outcome: Outcome Met, Continue evaluating goal progress toward completion     Problem: Delirium, Risk for  Goal: # No symptoms of delirium  Description: Evaluate delirium symptoms under active problem when present  Outcome: Outcome Met, Continue evaluating goal progress toward completion     Problem: VTE, Risk for  Goal: # No s/s of VTE  Outcome: Outcome Met, Continue evaluating goal progress toward completion     Problem: Pressure Injury, Risk for  Goal: # Skin remains intact  Outcome: Outcome Met, Continue evaluating goal progress toward completion  Goal: No new pressure injury (PI) development  Outcome: Outcome Met, Continue evaluating goal progress toward completion     Problem: Breathing Pattern Ineffective  Goal: Air exchange is effective, demonstrated by Sp02 sat of greater then or = 92% (or as ordered)  Outcome: Outcome Met, Continue evaluating goal progress toward completion      Medication Question or Clarification  Who is calling: [facility]  What medication are you calling about? (include dose and sig) Veramyst 27.5mcg nasal spray  Who prescribed the medication?: Dr France  What is your question/concern?: Fax received from pharmacy, the above medication is not covered by plan. Will provider please send new RX for Budesonide or Fluticasone Propionate?  [facility]  Okay to leave a detailed message?: No-speak to pharmacy staff  Site CMT - Please call the pharmacy to obtain any additional needed information.

## 2023-09-19 ENCOUNTER — OFFICE VISIT (OUTPATIENT)
Dept: FAMILY MEDICINE | Facility: CLINIC | Age: 33
End: 2023-09-19

## 2023-09-19 VITALS
WEIGHT: 145.1 LBS | HEIGHT: 67 IN | RESPIRATION RATE: 16 BRPM | TEMPERATURE: 98.7 F | SYSTOLIC BLOOD PRESSURE: 120 MMHG | OXYGEN SATURATION: 98 % | DIASTOLIC BLOOD PRESSURE: 72 MMHG | HEART RATE: 91 BPM | BODY MASS INDEX: 22.78 KG/M2

## 2023-09-19 DIAGNOSIS — R53.83 OTHER FATIGUE: ICD-10-CM

## 2023-09-19 DIAGNOSIS — Z12.4 CERVICAL CANCER SCREENING: ICD-10-CM

## 2023-09-19 DIAGNOSIS — M25.60 MORNING STIFFNESS OF JOINTS: ICD-10-CM

## 2023-09-19 DIAGNOSIS — R10.2 PELVIC PAIN IN FEMALE: ICD-10-CM

## 2023-09-19 DIAGNOSIS — Z30.015 ENCOUNTER FOR INITIAL PRESCRIPTION OF VAGINAL RING HORMONAL CONTRACEPTIVE: ICD-10-CM

## 2023-09-19 DIAGNOSIS — N92.1 MENORRHAGIA WITH IRREGULAR CYCLE: Primary | ICD-10-CM

## 2023-09-19 DIAGNOSIS — E55.9 VITAMIN D DEFICIENCY: ICD-10-CM

## 2023-09-19 LAB
ALBUMIN SERPL BCG-MCNC: 4.7 G/DL (ref 3.5–5.2)
ALP SERPL-CCNC: 59 U/L (ref 35–104)
ALT SERPL W P-5'-P-CCNC: 11 U/L (ref 0–50)
ANION GAP SERPL CALCULATED.3IONS-SCNC: 13 MMOL/L (ref 7–15)
AST SERPL W P-5'-P-CCNC: 19 U/L (ref 0–45)
BILIRUB SERPL-MCNC: 0.4 MG/DL
BUN SERPL-MCNC: 11.3 MG/DL (ref 6–20)
CALCIUM SERPL-MCNC: 9.4 MG/DL (ref 8.6–10)
CHLORIDE SERPL-SCNC: 101 MMOL/L (ref 98–107)
CREAT SERPL-MCNC: 0.59 MG/DL (ref 0.51–0.95)
CRP SERPL-MCNC: <3 MG/L
DEPRECATED HCO3 PLAS-SCNC: 25 MMOL/L (ref 22–29)
EGFRCR SERPLBLD CKD-EPI 2021: >90 ML/MIN/1.73M2
ERYTHROCYTE [DISTWIDTH] IN BLOOD BY AUTOMATED COUNT: 12.9 % (ref 10–15)
GLUCOSE SERPL-MCNC: 90 MG/DL (ref 70–99)
HCG INTACT+B SERPL-ACNC: <1 MIU/ML
HCT VFR BLD AUTO: 36.8 % (ref 35–47)
HGB BLD-MCNC: 11.7 G/DL (ref 11.7–15.7)
MCH RBC QN AUTO: 25.7 PG (ref 26.5–33)
MCHC RBC AUTO-ENTMCNC: 31.8 G/DL (ref 31.5–36.5)
MCV RBC AUTO: 81 FL (ref 78–100)
PLATELET # BLD AUTO: 318 10E3/UL (ref 150–450)
POTASSIUM SERPL-SCNC: 4 MMOL/L (ref 3.4–5.3)
PROT SERPL-MCNC: 8.1 G/DL (ref 6.4–8.3)
RBC # BLD AUTO: 4.56 10E6/UL (ref 3.8–5.2)
SODIUM SERPL-SCNC: 139 MMOL/L (ref 136–145)
TSH SERPL DL<=0.005 MIU/L-ACNC: 1.37 UIU/ML (ref 0.3–4.2)
WBC # BLD AUTO: 5.1 10E3/UL (ref 4–11)

## 2023-09-19 PROCEDURE — 36415 COLL VENOUS BLD VENIPUNCTURE: CPT | Performed by: FAMILY MEDICINE

## 2023-09-19 PROCEDURE — 85027 COMPLETE CBC AUTOMATED: CPT | Performed by: FAMILY MEDICINE

## 2023-09-19 PROCEDURE — 99214 OFFICE O/P EST MOD 30 MIN: CPT | Performed by: FAMILY MEDICINE

## 2023-09-19 PROCEDURE — 82306 VITAMIN D 25 HYDROXY: CPT | Performed by: FAMILY MEDICINE

## 2023-09-19 PROCEDURE — 80053 COMPREHEN METABOLIC PANEL: CPT | Performed by: FAMILY MEDICINE

## 2023-09-19 PROCEDURE — 84443 ASSAY THYROID STIM HORMONE: CPT | Performed by: FAMILY MEDICINE

## 2023-09-19 PROCEDURE — 83550 IRON BINDING TEST: CPT | Performed by: FAMILY MEDICINE

## 2023-09-19 PROCEDURE — 82728 ASSAY OF FERRITIN: CPT | Performed by: FAMILY MEDICINE

## 2023-09-19 PROCEDURE — 87624 HPV HI-RISK TYP POOLED RSLT: CPT | Performed by: FAMILY MEDICINE

## 2023-09-19 PROCEDURE — 84702 CHORIONIC GONADOTROPIN TEST: CPT | Performed by: FAMILY MEDICINE

## 2023-09-19 PROCEDURE — 86038 ANTINUCLEAR ANTIBODIES: CPT | Performed by: FAMILY MEDICINE

## 2023-09-19 PROCEDURE — 83540 ASSAY OF IRON: CPT | Performed by: FAMILY MEDICINE

## 2023-09-19 PROCEDURE — 86140 C-REACTIVE PROTEIN: CPT | Performed by: FAMILY MEDICINE

## 2023-09-19 PROCEDURE — G0145 SCR C/V CYTO,THINLAYER,RESCR: HCPCS | Performed by: FAMILY MEDICINE

## 2023-09-19 RX ORDER — ETONOGESTREL AND ETHINYL ESTRADIOL VAGINAL RING .015; .12 MG/D; MG/D
RING VAGINAL
Qty: 1 EACH | Refills: 1 | Status: SHIPPED | OUTPATIENT
Start: 2023-09-19
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
## 2023-09-20 LAB — ANA SER QL IF: NEGATIVE

## 2023-09-21 LAB
BKR LAB AP GYN ADEQUACY: NORMAL
BKR LAB AP GYN INTERPRETATION: NORMAL
BKR LAB AP HPV REFLEX: NORMAL
BKR LAB AP LMP: NORMAL
BKR LAB AP PREVIOUS ABNORMAL: NORMAL
PATH REPORT.COMMENTS IMP SPEC: NORMAL
PATH REPORT.COMMENTS IMP SPEC: NORMAL
PATH REPORT.RELEVANT HX SPEC: NORMAL

## 2023-09-22 ENCOUNTER — MYC MEDICAL ADVICE (OUTPATIENT)
Dept: FAMILY MEDICINE | Facility: CLINIC | Age: 33
End: 2023-09-22

## 2023-09-22 DIAGNOSIS — R53.83 OTHER FATIGUE: Primary | ICD-10-CM

## 2023-09-22 LAB
DEPRECATED CALCIDIOL+CALCIFEROL SERPL-MC: 27 UG/L (ref 20–75)
FERRITIN SERPL-MCNC: 40 NG/ML (ref 6–175)
HUMAN PAPILLOMA VIRUS 16 DNA: NEGATIVE
HUMAN PAPILLOMA VIRUS 18 DNA: NEGATIVE
HUMAN PAPILLOMA VIRUS FINAL DIAGNOSIS: NORMAL
HUMAN PAPILLOMA VIRUS OTHER HR: NEGATIVE
IRON BINDING CAPACITY (ROCHE): 362 UG/DL (ref 240–430)
IRON SATN MFR SERPL: 20 % (ref 15–46)
IRON SERPL-MCNC: 74 UG/DL (ref 37–145)

## 2023-09-22 NOTE — TELEPHONE ENCOUNTER
Routing to provider to advise.   Per office visit note from 9/19/23 - pt to follow up in clinic in 2 weeks.

## 2023-09-29 ENCOUNTER — HOSPITAL ENCOUNTER (EMERGENCY)
Facility: CLINIC | Age: 33
Discharge: HOME | End: 2023-09-29

## 2023-09-29 VITALS
OXYGEN SATURATION: 100 % | HEART RATE: 85 BPM | BODY MASS INDEX: 22.76 KG/M2 | DIASTOLIC BLOOD PRESSURE: 98 MMHG | TEMPERATURE: 99.1 F | SYSTOLIC BLOOD PRESSURE: 148 MMHG | RESPIRATION RATE: 18 BRPM | HEIGHT: 67 IN | WEIGHT: 145 LBS

## 2023-09-29 DIAGNOSIS — K08.89 PAIN, DENTAL: ICD-10-CM

## 2023-09-29 DIAGNOSIS — K05.20 ACUTE PERIODONTITIS: ICD-10-CM

## 2023-09-29 DIAGNOSIS — K05.10 GINGIVITIS: ICD-10-CM

## 2023-09-29 DIAGNOSIS — K06.8 PAIN OF GINGIVA: ICD-10-CM

## 2023-09-29 PROCEDURE — 99284 EMERGENCY DEPT VISIT MOD MDM: CPT

## 2023-09-29 PROCEDURE — 250N000011 HC RX IP 250 OP 636

## 2023-09-29 PROCEDURE — 250N000013 HC RX MED GY IP 250 OP 250 PS 637

## 2023-09-29 PROCEDURE — 96372 THER/PROPH/DIAG INJ SC/IM: CPT

## 2023-09-29 RX ORDER — OXYCODONE HYDROCHLORIDE 5 MG/1
5 TABLET ORAL EVERY 6 HOURS PRN
Qty: 5 TABLET | Refills: 0 | Status: SHIPPED | OUTPATIENT
Start: 2023-09-29 | End: 2023-10-02

## 2023-09-29 RX ORDER — LIDOCAINE HYDROCHLORIDE 20 MG/ML
15 SOLUTION OROPHARYNGEAL
Qty: 100 ML | Refills: 0 | Status: SHIPPED | OUTPATIENT
Start: 2023-09-29

## 2023-09-29 RX ORDER — KETOROLAC TROMETHAMINE 15 MG/ML
15 INJECTION, SOLUTION INTRAMUSCULAR; INTRAVENOUS ONCE
Status: COMPLETED | OUTPATIENT
Start: 2023-09-29 | End: 2023-09-29

## 2023-09-29 RX ORDER — OXYCODONE HYDROCHLORIDE 5 MG/1
5 TABLET ORAL ONCE
Status: COMPLETED | OUTPATIENT
Start: 2023-09-29 | End: 2023-09-29

## 2023-09-29 RX ADMIN — KETOROLAC TROMETHAMINE 15 MG: 15 INJECTION INTRAMUSCULAR; INTRAVENOUS at 21:12

## 2023-09-29 RX ADMIN — OXYCODONE HYDROCHLORIDE 5 MG: 5 TABLET ORAL at 21:09

## 2023-09-29 ASSESSMENT — ENCOUNTER SYMPTOMS
SORE THROAT: 1
CHILLS: 0
VOMITING: 0
COUGH: 0
FEVER: 0
NAUSEA: 1

## 2023-09-29 ASSESSMENT — ACTIVITIES OF DAILY LIVING (ADL)
ADLS_ACUITY_SCORE: 35
ADLS_ACUITY_SCORE: 35

## 2023-09-30 ASSESSMENT — ACTIVITIES OF DAILY LIVING (ADL)
ADLS_ACUITY_SCORE: 35

## 2023-09-30 NOTE — ED TRIAGE NOTES
Patient presents to ED with swelling to upper and lower gums, has pain to L side of jaw, this all began 4 days ago, patient finished ABX one week ago for R sided jaw pain, unable to get into dentist as has no dental insurance, having nausea.  Chrystal Bella RN.......9/29/2023 8:00 PM     Triage Assessment       Row Name 09/29/23 1958       Triage Assessment (Adult)    Airway WDL WDL       Respiratory WDL    Respiratory WDL WDL       Skin Circulation/Temperature WDL    Skin Circulation/Temperature WDL WDL       Cardiac WDL    Cardiac WDL WDL       Peripheral/Neurovascular WDL    Peripheral Neurovascular WDL WDL       Cognitive/Neuro/Behavioral WDL    Cognitive/Neuro/Behavioral WDL WDL

## 2023-09-30 NOTE — DISCHARGE INSTRUCTIONS
You were seen in the ER for tooth and gum pain.  We do suspect that your pain is due to ongoing inflammation and gum disease.  Ultimately, this will need treatment by a dentist.  We did place a referral, they will call you to make an appointment.  In the meantime, start taking the antibiotic Augmentin twice a day for 14 days.  Use the viscous lidocaine every 3 hours as needed for mouth sores and pain.  We will have a short course of oxycodone for breakthrough pain.  Please do not drive while taking this medication as it causes drowsiness.  Please follow-up with one of the low-cost and no insurance dental clinics listed below.  Return to the ER if you have any new or worsening symptoms.    Dental Clinics with no or reduced fees    [facility]   The Dental Emergency Room  [address], [facility]  [phone] Accepts [place]    [facility]  [address], [facility]  [phone] No Fee Hours and services vary each month - call them before going.  Sometimes only offer extractions.   [facility]  [address] , [facility]  [phone] Sliding fee: ER visit - $50 up front  regular - $35 up front Call or arrive at 7:45 AM on Mondays, Tues, or Thursdays to sign up for same-day appointments for dental pain / emergencies.        [facility] Sliding  fee  Call for details Walk-ins and same-day appointments  Walk-in's accepted 8-11AM and 1-4PM  Monday-Friday   [address]     [phone]          [facility] dental Sliding fee If no insurance, call first.    [address], [facility]     [phone]          [facility]  [address], [facility]  [phone] Sliding fee Call 8:00 AM Mon-Thurs for next-day  appointments (for emergencies/pain only) Help with [place] is available.        [facility]  [address], [facility]  [phone] Call for fees Walk-in appointments available from 8am-3:30pm.  Standard  appointments also available.              [facility]  [address]  [facility]  [address]  [phone] No cost Open Monday- Thursday, 8am-9pm        [facility]   [address]  [phone] Sliding fee  $40 up front No walk-ins. Call at 8AM Mon-Fri for same- day visits.  Can schedule Saturday emergency visits by calling Friday morning.   [facility]  [address]  [phone] Sliding fee  Call for details No walk-ins.  For emergency appointments:  Call on Thursday 3PM (for Friday appt) OR Call on Friday 3PM (for Monday appt)   [facility]  [address] [place] [address]  [phone] Sliding fee- Call for Details Mon, Tues 7am-5:30pm  Wed 7am-8pm  Thurs 7am-7pm  Fri 8am-5pm   [facility]  [address] [facility]  [phone] Sliding fee -   Call for details Mon, Tues, Thurs, Fri- 8am-4:30 PM  Wed 8:30 AM to 8 pm             OTHER RESOURCES       [facility],   [address] [address]  [address] [place]  [address]  [phone]    Referral Service, 1-800-DENTIST        Open 9a to 9p 7 days a week               [employer], 1-472.437.7701 For people who are elderly, disabled, or medically compromised and have no other way to pay for dental care. Call to get an application. If approved, services are provided through volunteer dentists.        REVISED 2018-10

## 2023-09-30 NOTE — ED PROVIDER NOTES
EMERGENCY DEPARTMENT ENCOUNTER      NAME: Sarah Ann  AGE: 33 year old female  YOB: 1990  MRN: [id number]  EVALUATION DATE & TIME: 9/29/2023  8:00 PM    PCP: Marilu France    ED PROVIDER: Sonja Rivas PA-C    Chief Complaint   Patient presents with    Dental Pain     FINAL IMPRESSION:  1. Pain, dental    2. Pain of gingiva    3. Gingivitis    4. Acute periodontitis      MEDICAL DECISION MAKING:    Pertinent Labs & Imaging studies reviewed. (See chart for details)  Sarah Ann is a 33 year old female who presents for evaluation of dental pain.  Patient here with several weeks of bilateral lower tooth pain.  Was evaluated at [facility] on 9/9/2023 and prescribed amoxicillin.  Reports mild improvement, however symptoms returned about 4 days ago.  Does not currently have dental insurance and has not seen a dentist in several years.  Has braces, but reports no one manages these.  Has not tried Tylenol or ibuprofen for the pain.  Denies known injury to the teeth.  Also reporting nausea, sore throat, cough, nasal congestion.     On my initial evaluation, vital signs normal. On physical exam patient is awake, alert, no acute distress, resting comfortably at edge of bed.  Does not appear uncomfortable, ill or toxic.  On inspection of her mouth, she has braces in place with several missing wires to the braces.  She has very inflamed and erythematous gum lines to her entire bottom teeth.  No fluctuance or areas of abscess.  No obvious cracked or missing teeth she also has very inflamed gumline to the top teeth, specifically over her tooth #12 and 13.  She has malpositioned and missing wires of her braces to teeth #26 through 22.    Differential diagnosis includes gingivitis, periodontitis, dental caries, malpositioning of hardware, periapical abscess, deep space infection, pulpitis.     Based on clinical exam and history, suspect this is very severe gingivitis and periodontitis in the setting of  malpositioning hardware braces and poor dental hygiene.  I do not appreciate any areas of fluctuance or abscess that would require drainage today.  She will ultimately need to follow-up with a dentist for ongoing management of her dental disease.  She reports that she does not have dental insurance, I provided her a list on discharge today with low or no cost dental clinics.  Will send home with Augmentin, was recently treated for amoxicillin, so this will have broader coverage.  We will also send home with viscous lidocaine and short course of oxycodone for breakthrough pain.  Did provide some relief with bupivacaine today.  Patient is otherwise clinically well-appearing and vitally stable.  No clinical findings of deep space infection, no lip or tongue swelling to suggest angioedema.  She is tolerating secretions appropriately.  Low suspicion for any emergent process that would require further ER intervention or hospital admission.  Provided expectant management as well as strict return precautions.    Patient has had serial examinations and notes significant improvement.     Patient was discharged in stable condition with treatment plan as below. Instructed to follow up with primary care provider in 3 days and with dentist and return to the emergency department with any new or worsening of symptoms. Patient expressed understanding, feels comfortable, and is in agreement with this plan. All questions addressed prior to discharge.    Medical Decision Making    History:  Supplemental history from: Documented in chart, if applicable  External Record(s) reviewed: Documented in chart, if applicable. and Outpatient Record: [facility] [place] visit 9/9/23    Work Up:  Chart documentation includes differential considered and any EKGs or imaging independently interpreted by provider, where specified.  In additional to work up documented, I considered the following work up: Documented in chart, if applicable.    External  consultation:  Discussion of management with another provider: Documented in chart, if applicable    Complicating factors:  Care impacted by chronic illness: N/A  Care affected by social determinants of health: Access to Medical Care    Disposition considerations: Discharge. I prescribed additional prescription strength medication(s) as charted. N/A.    ED COURSE:  8:15 PM  I reviewed the patient's chart. I met with the patient to gather history and to perform my initial exam.   9:19 PM  We discussed plan for discharge including treatment plan, follow-up and return precautions to emergency department.  Patient voiced understanding and in agreement with this plan.    At the conclusion of the encounter I discussed the results of all of the tests and the disposition. The questions were answered. The patient or family acknowledged understanding and was agreeable with the care plan.     Voice recognition software was used in the creation of this note. Any grammatical or nonsensical errors are due to inherent errors with the software and are not the intention of the writer.     MEDICATIONS GIVEN IN THE EMERGENCY:  Medications   oxyCODONE (ROXICODONE) tablet 5 mg (5 mg Oral $Given 9/29/23 2109)   ketorolac (TORADOL) injection 15 mg (15 mg Intramuscular $Given 9/29/23 2112)     NEW PRESCRIPTIONS STARTED AT TODAY'S ER VISIT  New Prescriptions    AMOXICILLIN-CLAVULANATE (AUGMENTIN) 875-125 MG TABLET    Take 1 tablet by mouth 2 times daily for 14 days    LIDOCAINE, VISCOUS, (XYLOCAINE) 2 % SOLUTION    Swish and spit 15 mLs in mouth every 3 hours as needed for pain ; Max 8 doses/24 hour period.    OXYCODONE (ROXICODONE) 5 MG TABLET    Take 1 tablet (5 mg) by mouth every 6 hours as needed for pain     =================================================================    HPI:    Patient information was obtained from: Patient     Use of Interpretor: N/A     Sarah Ann is a 33 year old female with no pertinent history on  file who presents to this ED by walk in for evaluation of dental pain    Per chart review, patient seen at [facility] ED on 9/9/2023 for right sided dental pain and swelling. No evidence of dental abscess. Home with amoxicillin, peridex, Tylenol and ibuprofen    Per patient, she had right sided dental pain and swelling for 3 weeks. She was evaluated and was given amoxicillin and finished taking them on 9/19. She had some improvement but says that her pain and swelling returned 4 days ago but this time it is in the left side of her face. She also endorses nausea and sore throat. Patient says that she doesn't currently have dental insurance so she hasn't been able to see a dentist. She says garlic and heat have helped her symptoms. Patient denies fever, chills, vomiting, cough, sick contacts, and dental injuries.  Denies lip or tongue swelling or difficulty breathing.    REVIEW OF SYSTEMS:  Review of Systems   Constitutional:  Negative for chills and fever.   HENT:  Positive for dental problem (Left sided pain and swelling) and sore throat.    Respiratory:  Negative for cough.    Gastrointestinal:  Positive for nausea. Negative for vomiting.   All other systems reviewed and are negative.       PAST MEDICAL HISTORY:  Past Medical History:   Diagnosis Date    Anemia during pregnancy in third trimester 7/2/2019    Benign breast lumps 3/30/2015    Chlamydia     age 15    Depression     no meds or therapy    Female infertility     trying to get pregnant for 4 years, no infertility work up    Ovarian cyst     Rh incompatibility     Varicella     childhood, 13       PAST SURGICAL HISTORY:  Past Surgical History:   Procedure Laterality Date    DILATION AND CURETTAGE N/A 12/16/2014    Procedure: DILATION AND CURETTAGE SUCTION;  Surgeon: Florencio Bonilla MD;  Location: [facility];  Service:     WISDOM TOOTH EXTRACTION  2015       CURRENT MEDICATIONS:    No current facility-administered medications for this  encounter.    Current Outpatient Medications:     amoxicillin-clavulanate (AUGMENTIN) 875-125 MG tablet, Take 1 tablet by mouth 2 times daily for 14 days, Disp: 28 tablet, Rfl: 0    lidocaine, viscous, (XYLOCAINE) 2 % solution, Swish and spit 15 mLs in mouth every 3 hours as needed for pain ; Max 8 doses/24 hour period., Disp: 100 mL, Rfl: 0    oxyCODONE (ROXICODONE) 5 MG tablet, Take 1 tablet (5 mg) by mouth every 6 hours as needed for pain, Disp: 5 tablet, Rfl: 0    etonogestrel-ethinyl estradiol (NUVARING) 0.12-0.015 MG/24HR vaginal ring, Insert one (1) ring vaginally and leave in place for 3 consecutive weeks (21 days), then remove for 1 week., Disp: 1 each, Rfl: 1    ferrous sulfate 325 (65 FE) MG tablet, [FERROUS SULFATE 325 (65 FE) MG TABLET] Take 1 tablet (325 mg total) by mouth 2 (two) times a day with meals. (Patient not taking: Reported on 9/19/2023), Disp: 90 tablet, Rfl: 0    fluticasone propionate (FLONASE) 50 mcg/actuation nasal spray, [FLUTICASONE PROPIONATE (FLONASE) 50 MCG/ACTUATION NASAL SPRAY] Apply 1 spray into each nostril daily. (Patient not taking: Reported on 9/19/2023), Disp: , Rfl:     prenat.vits,jax,min-iron-folic (PRENATAL VITAMIN) Tab, [PRENAT.VITS,JAX,MIN-IRON-FOLIC (PRENATAL VITAMIN) TAB] Take by mouth daily. (Patient not taking: Reported on 9/19/2023), Disp: , Rfl:     ALLERGIES:  Allergies   Allergen Reactions    Latex Rash       FAMILY HISTORY:  Family History   Problem Relation Age of Onset    Rheumatoid Arthritis Mother     Hypertension Mother     Fibromyalgia Mother     Colon Cancer Maternal Grandmother     Diabetes Maternal Grandmother     Lung Cancer Maternal Grandmother     Hypertension Maternal Grandmother     Heart Disease Maternal Grandfather     No Known Problems Sister     No Known Problems Brother     Diabetes Paternal Grandmother     No Known Problems Brother     No Known Problems Brother     No Known Problems Sister     Heart Murmur Sister     No Known Problems  "Sister        SOCIAL HISTORY:   Social History     Socioeconomic History    Marital status: Single   Tobacco Use    Smoking status: Never    Smokeless tobacco: Never   Substance and Sexual Activity    Alcohol use: Yes     Alcohol/week: 1.0 standard drink of alcohol     Comment: Alcoholic Drinks/day: 1 every couple of months    Drug use: No    Sexual activity: Yes     Partners: Male     Comment: has not been active in the last 5 months   Social History Narrative    Lives with family.        VITALS:  Patient Vitals for the past 24 hrs:   BP Temp Temp src Pulse Resp SpO2 Height Weight   09/29/23 2115 (!) 148/98 99.1  F (37.3  C) Oral 85 -- 100 % -- --   09/29/23 1957 137/88 99.3  F (37.4  C) Temporal 80 18 99 % 1.689 m (5' 6.5\") 65.8 kg (145 lb)       PHYSICAL EXAM    Constitutional: Well developed, Well nourished, NAD  HENT: Normocephalic, Atraumatic, Bilateral external ears normal, On inspection of her mouth, she has braces in place with several missing wires to the braces.  She has very inflamed and erythematous gum lines to her entire bottom teeth.  No fluctuance or areas of abscess.  No obvious cracked or missing teeth she also has very inflamed gumline to the top teeth, specifically over her tooth #12 and 13.  She has malpositioned and missing wires of her braces to teeth #26 through 22.mucous membranes moist, Nose normal.   Neck: Normal range of motion, No tenderness, Supple, No stridor.  Eyes: PERRL, EOMI, Conjunctiva normal, No discharge.   Musculoskeletal: 2+ DP pulses. No edema. No cyanosis, No clubbing. Good range of motion in all major joints. No tenderness to palpation or major deformities noted. No tenderness of the CTLS spine.   Integument: Warm, Dry, No erythema, No rash. No petechiae.  Neurologic: Alert & oriented x 3, Normal motor function, Normal sensory function, No focal deficits noted. Normal gait.  Psychiatric: Affect normal, Judgment normal, Mood normal. Cooperative.    LAB:  All pertinent labs " reviewed and interpreted.  Labs Ordered and Resulted from Time of ED Arrival to Time of ED Departure - No data to display    RADIOLOGY:  Reviewed all pertinent imaging. Please see official radiology report.  No orders to display       EKG:    None     PROCEDURES:   None     Diagnosis:  1. Pain, dental    2. Pain of gingiva    3. Gingivitis    4. Acute periodontitis      IEphraim, am serving as a scribe to document services personally performed by Sonja Rivas PA-C based on my observation and the provider's statements to me. I, Sonja Rivas PA-C attest that Ephraim Velásquez is acting in a scribe capacity, has observed my performance of the services and has documented them in accordance with my direction.    Sonja Rivas PA-C  Emergency Medicine  [facility]  9/29/2023       Sonja Rivas PA-C  09/30/23 0012

## 2023-10-08 ENCOUNTER — HEALTH MAINTENANCE LETTER (OUTPATIENT)
Age: 33
End: 2023-10-08

## 2023-10-09 ENCOUNTER — TELEPHONE (OUTPATIENT)
Dept: OBGYN | Facility: CLINIC | Age: 33
End: 2023-10-09

## 2023-10-09 DIAGNOSIS — N93.9 ABNORMAL UTERINE BLEEDING: Primary | ICD-10-CM

## 2023-10-09 NOTE — TELEPHONE ENCOUNTER
Attempted to call patient to schedule visit with OB/GYN rather than IM per Dr. Martin as pt experiencing vaginal bleeding/other gyn symptoms. US recommended, ordered per protocol. Called x2, unable to connect with patient or leave voicemail. Sent pt Face[employer]comt message.

## 2024-04-03 LAB
ABO/RH(D): NORMAL
ANTIBODY SCREEN: NEGATIVE
SPECIMEN EXPIRATION DATE: NORMAL

## 2024-04-04 ENCOUNTER — LAB (OUTPATIENT)
Dept: LAB | Facility: CLINIC | Age: 34
End: 2024-04-04

## 2024-04-04 ENCOUNTER — ANCILLARY PROCEDURE (OUTPATIENT)
Dept: ULTRASOUND IMAGING | Facility: CLINIC | Age: 34
End: 2024-04-04
Attending: FAMILY MEDICINE

## 2024-04-04 ENCOUNTER — OFFICE VISIT (OUTPATIENT)
Dept: OBGYN | Facility: CLINIC | Age: 34
End: 2024-04-04

## 2024-04-04 VITALS
WEIGHT: 151 LBS | BODY MASS INDEX: 23.7 KG/M2 | DIASTOLIC BLOOD PRESSURE: 78 MMHG | HEIGHT: 67 IN | HEART RATE: 76 BPM | SYSTOLIC BLOOD PRESSURE: 119 MMHG

## 2024-04-04 DIAGNOSIS — N92.1 MENORRHAGIA WITH IRREGULAR CYCLE: ICD-10-CM

## 2024-04-04 DIAGNOSIS — N92.1 MENORRHAGIA WITH IRREGULAR CYCLE: Primary | ICD-10-CM

## 2024-04-04 DIAGNOSIS — R10.2 PELVIC PAIN IN FEMALE: ICD-10-CM

## 2024-04-04 LAB
ERYTHROCYTE [DISTWIDTH] IN BLOOD BY AUTOMATED COUNT: 13.4 % (ref 10–15)
FERRITIN SERPL-MCNC: 41 NG/ML (ref 6–175)
HCT VFR BLD AUTO: 35.4 % (ref 35–47)
HGB BLD-MCNC: 11.3 G/DL (ref 11.7–15.7)
IRON BINDING CAPACITY (ROCHE): 328 UG/DL (ref 240–430)
IRON SATN MFR SERPL: 28 % (ref 15–46)
IRON SERPL-MCNC: 92 UG/DL (ref 37–145)
MCH RBC QN AUTO: 25.7 PG (ref 26.5–33)
MCHC RBC AUTO-ENTMCNC: 31.9 G/DL (ref 31.5–36.5)
MCV RBC AUTO: 81 FL (ref 78–100)
PLATELET # BLD AUTO: 289 10E3/UL (ref 150–450)
RBC # BLD AUTO: 4.4 10E6/UL (ref 3.8–5.2)
TRANSFERRIN SERPL-MCNC: 284 MG/DL (ref 200–360)
WBC # BLD AUTO: 5.1 10E3/UL (ref 4–11)

## 2024-04-04 PROCEDURE — 99213 OFFICE O/P EST LOW 20 MIN: CPT | Performed by: STUDENT IN AN ORGANIZED HEALTH CARE EDUCATION/TRAINING PROGRAM

## 2024-04-04 PROCEDURE — 86900 BLOOD TYPING SEROLOGIC ABO: CPT

## 2024-04-04 PROCEDURE — 76830 TRANSVAGINAL US NON-OB: CPT | Performed by: OBSTETRICS & GYNECOLOGY

## 2024-04-04 PROCEDURE — 82728 ASSAY OF FERRITIN: CPT

## 2024-04-04 PROCEDURE — 76856 US EXAM PELVIC COMPLETE: CPT | Performed by: OBSTETRICS & GYNECOLOGY

## 2024-04-04 PROCEDURE — 84466 ASSAY OF TRANSFERRIN: CPT

## 2024-04-04 PROCEDURE — 36415 COLL VENOUS BLD VENIPUNCTURE: CPT

## 2024-04-04 PROCEDURE — 85027 COMPLETE CBC AUTOMATED: CPT

## 2024-04-04 PROCEDURE — 99203 OFFICE O/P NEW LOW 30 MIN: CPT | Mod: GE | Performed by: OBSTETRICS & GYNECOLOGY

## 2024-04-04 PROCEDURE — 83550 IRON BINDING TEST: CPT

## 2024-04-04 RX ORDER — SENNA AND DOCUSATE SODIUM 50; 8.6 MG/1; MG/1
1 TABLET, FILM COATED ORAL 2 TIMES DAILY PRN
Qty: 90 TABLET | Refills: 3 | Status: SHIPPED | OUTPATIENT
Start: 2024-04-04

## 2024-04-04 RX ORDER — ACETAMINOPHEN 325 MG/1
975 TABLET ORAL ONCE
OUTPATIENT
Start: 2024-04-04 | End: 2024-04-04

## 2024-04-04 RX ORDER — FERROUS SULFATE 325(65) MG
325 TABLET ORAL
Qty: 90 TABLET | Refills: 3 | Status: SHIPPED | OUTPATIENT
Start: 2024-04-04

## 2024-04-04 NOTE — PATIENT INSTRUCTIONS
Thank you for trusting us with your care!     If you need to contact us for questions about:  Symptoms, Scheduling & Medical Questions; Non-urgent (2-3 day response) [place] message, Urgent (needing response today) [phone] (if after 3:30pm next day response)   Prescriptions: Please call your Pharmacy   Billing: [facility] [phone] or [facility]:[phone]

## 2024-04-04 NOTE — PROGRESS NOTES
[facility]  Gynecology Visit    Reason for Visit: menorrhagia    HPI:    Sarah Dooley is a 33 year old , here due to new onset heavy bleeding with menses starting 7 months ago. She has to use overnight pads and diapers and still wakes up with blood leaking outside of pads. She passes many clots. She has had increased cramping/pain with bleeding as well. She has been having more frequent bleeding as well. Bleeding for 1-2 weeks and has bleeding every 2 weeks or so. Only having 1 week without any bleeding. Has been feeling lightheaded and SOB recently.    Prior to these last 7 months, she had normal periods every 28-30 days, with 3 days of relatively light bleeding with periods.    No history of bleeding disorders. Has not started any new medications. Is not any blood thinners.    She has been on Depo in the past but had constant bleeding with this, has never tried other contraception methods. Has not tried anything for bleeding since this started.    No signifcant medical conditions. Has had a CS x1, no other surgeries. No allergies, no medications.       GYN History  Menses: see above  LMP: Patient's last menstrual period was 2024 (approximate).  Pap Smears: Last NILM, HPV neg (2023)    OBHx  OB History    Para Term  AB Living   4 1 1 0 3 1   SAB IAB Ectopic Multiple Live Births   1 2 0 0 1      # Outcome Date GA Lbr Cesar/2nd Weight Sex Delivery Anes PTL Lv   4 Term 19 39w6d  3.303 kg (7 lb 4.5 oz) M    ALTHEA      Birth Comments: Had preeclampsia. Postpartum hemorrhage requiring blood transfusion      Name: RONEL DOOLEY      Apgar1: 8  Apgar5: 9   3 SAB      SAB      2 IAB 08     IAB         Birth Comments: D&C   1 IAB 08     IAB         Birth Comments: First trimester surgical        Past Medical History:   Diagnosis Date    Anemia during pregnancy in third trimester 2019    Benign breast lumps 3/30/2015    Chlamydia     age 15     Depression     no meds or therapy    Female infertility     trying to get pregnant for 4 years, no infertility work up    Ovarian cyst     Rh incompatibility     Varicella     childhood, 13       Past Surgical History:   Procedure Laterality Date    DILATION AND CURETTAGE N/A 12/16/2014    Procedure: DILATION AND CURETTAGE SUCTION;  Surgeon: Florencio Bonilla MD;  Location: [facility];  Service:     WISDOM TOOTH EXTRACTION  2015         Current Outpatient Medications:     ferrous sulfate (FEROSUL) 325 (65 Fe) MG tablet, Take 1 tablet (325 mg) by mouth daily (with breakfast), Disp: 90 tablet, Rfl: 3    SENNA-docusate sodium (SENNA S) 8.6-50 MG tablet, Take 1 tablet by mouth 2 times daily as needed (Constipation), Disp: 90 tablet, Rfl: 3    etonogestrel-ethinyl estradiol (NUVARING) 0.12-0.015 MG/24HR vaginal ring, Insert one (1) ring vaginally and leave in place for 3 consecutive weeks (21 days), then remove for 1 week. (Patient not taking: Reported on 4/4/2024), Disp: 1 each, Rfl: 1    ferrous sulfate 325 (65 FE) MG tablet, [FERROUS SULFATE 325 (65 FE) MG TABLET] Take 1 tablet (325 mg total) by mouth 2 (two) times a day with meals. (Patient not taking: Reported on 9/19/2023), Disp: 90 tablet, Rfl: 0    fluticasone propionate (FLONASE) 50 mcg/actuation nasal spray, [FLUTICASONE PROPIONATE (FLONASE) 50 MCG/ACTUATION NASAL SPRAY] Apply 1 spray into each nostril daily. (Patient not taking: Reported on 9/19/2023), Disp: , Rfl:     lidocaine, viscous, (XYLOCAINE) 2 % solution, Swish and spit 15 mLs in mouth every 3 hours as needed for pain ; Max 8 doses/24 hour period. (Patient not taking: Reported on 4/4/2024), Disp: 100 mL, Rfl: 0    prenat.vits,jax,min-iron-folic (PRENATAL VITAMIN) Tab, [PRENAT.VITS,JAX,MIN-IRON-FOLIC (PRENATAL VITAMIN) TAB] Take by mouth daily. (Patient not taking: Reported on 9/19/2023), Disp: , Rfl:     Allergies   Allergen Reactions    Latex Rash and Unknown       Family History   Problem  "Relation Age of Onset    Rheumatoid Arthritis Mother     Hypertension Mother     Fibromyalgia Mother     Colon Cancer Maternal Grandmother     Diabetes Maternal Grandmother     Lung Cancer Maternal Grandmother     Hypertension Maternal Grandmother     Heart Disease Maternal Grandfather     No Known Problems Sister     No Known Problems Brother     Diabetes Paternal Grandmother     No Known Problems Brother     No Known Problems Brother     No Known Problems Sister     Heart Murmur Sister     No Known Problems Sister    Maternal grandma had colon cancer - dx at 60, passed at ~68; no family history of bleeding disorders. T2DM on dad's side. Heart problems mom's side.  No family history of ovarian, breast, uterine cancer.    Social History     Socioeconomic History    Marital status: Single     Spouse name: Not on file    Number of children: Not on file    Years of education: Not on file    Highest education level: Not on file   Occupational History    Not on file   Tobacco Use    Smoking status: Never    Smokeless tobacco: Never   Substance and Sexual Activity    Alcohol use: Yes     Alcohol/week: 1.0 standard drink of alcohol     Comment: Alcoholic Drinks/day: 1 every couple of months    Drug use: No    Sexual activity: Yes     Partners: Male     Comment: has not been active in the last 5 months   Other Topics Concern    Not on file   Social History Narrative    Lives with family.      Social Determinants of Health     Financial Resource Strain: Not on file   Food Insecurity: Not on file   Transportation Needs: Not on file   Physical Activity: Not on file   Stress: Not on file   Social Connections: Not on file   Interpersonal Safety: Not on file   Housing Stability: Not on file       ROS: 10-Point ROS negative except as noted in HPI    Physical Exam  /78   Pulse 76   Ht 1.689 m (5' 6.5\")   Wt 68.5 kg (151 lb)   LMP 03/27/2024 (Approximate)   BMI 24.01 kg/m    Gen: Well-appearing, NAD  HEENT: Normocephalic, " atraumatic  CV:  Well perfused, regular rate  Pulm: Breathing comfortably on room air    Imaging  Pelvic US (2024)  Uterus: anteverted. Contour is irregular w/ myomata: 1 Submucosal 1.65 x 1.75 x 2.05 cm and 2 Intramural Posterior JOY 1.48 x 1.24 x 1.51 cm.  Size: 9.47 x 5.94 x 4.88 cm  Endometrium: Thickness Total 21.07 mm  Findings: Submucosal fibroid noted     Right Ovary: 3.83 x 3.11 x 2.56 cm. Wnl  Largest follicle 11 x 10.2 x 14 mm  Left Ovary: 3.41 x 3.02 x 2.11 cm. Wnl  Cul de Sac Free Fluid: Mild  Technique: Transvaginal Imaging performed  Transabdominal Imaging performed     Impression:   Two fibroids seen:  2.05cm submucosal fibroid and 1.51cm intramural posterior fibroid.  Bilateral ovaries are within normal limits.    Assessment/Plan:  Sarah Ann is a 33 year old  female here for menorrhagia.     Menorrhagia  Submucosal fibroid  - Discussed that she was found to have a 2cm submucosal fibroid which maybe causing her heavy bleeding. Recommend proceeding with hysteroscopy, D&C to remove fibroid. Additionally recommend placing Mirena IUD to decrease bleeding at the time of procedure. Patient is not sure about this, but will let us know. She does not desire future fertility.  - Orders placed for hysteroscopy, D&C with possible Mirena IUD insertion  - Low concern for malignancy given minimal risk factors for endometrial cancer and normal pap smear and pelvic exam in 2023.  - CBC, iron studies ordered today given anemia symptoms. Discussed we would recommend going to ED if Hgb < 7. Iron pills and stool softeners ordered.    Staffed with Dr. Oral Fortune MD  Obstetrics & Gynecology, PGY-4  2024 9:15 PM    The Patient was seen in [facility] by FRED FORTUNE.  I reviewed the history & exam. Assessment and plan were jointly made.    Amber Mixon MD

## 2024-04-16 ENCOUNTER — HOSPITAL ENCOUNTER (OUTPATIENT)
Facility: AMBULATORY SURGERY CENTER | Age: 34
End: 2024-04-16
Attending: OBSTETRICS & GYNECOLOGY | Admitting: OBSTETRICS & GYNECOLOGY

## 2024-04-16 PROBLEM — N92.1 MENORRHAGIA WITH IRREGULAR CYCLE: Status: ACTIVE | Noted: 2024-04-04

## 2024-04-22 ENCOUNTER — TELEPHONE (OUTPATIENT)
Dept: OBGYN | Facility: CLINIC | Age: 34
End: 2024-04-22

## 2024-04-22 NOTE — TELEPHONE ENCOUNTER
LVM for patient to let her know we are going to cancel her surgery that is scheduled for 4/24 due to no insurance on file. Let patient know if she does have insurance to reach out and we can update her chart and add her back to the surgery schedule    [facility]   Surgery Scheduler

## 2024-05-22 ENCOUNTER — TELEPHONE (OUTPATIENT)
Dept: FAMILY MEDICINE | Facility: CLINIC | Age: 34
End: 2024-05-22

## 2024-05-24 ENCOUNTER — MYC REFILL (OUTPATIENT)
Dept: OBGYN | Facility: CLINIC | Age: 34
End: 2024-05-24

## 2024-05-24 DIAGNOSIS — N92.1 MENORRHAGIA WITH IRREGULAR CYCLE: ICD-10-CM

## 2024-05-24 RX ORDER — FERROUS SULFATE 325(65) MG
325 TABLET ORAL
Qty: 90 TABLET | Refills: 3 | Status: CANCELLED | OUTPATIENT
Start: 2024-05-24

## 2024-05-24 RX ORDER — SENNA AND DOCUSATE SODIUM 50; 8.6 MG/1; MG/1
1 TABLET, FILM COATED ORAL 2 TIMES DAILY PRN
Qty: 90 TABLET | Refills: 3 | Status: CANCELLED | OUTPATIENT
Start: 2024-05-24

## 2024-12-01 ENCOUNTER — HEALTH MAINTENANCE LETTER (OUTPATIENT)
Age: 34
End: 2024-12-01

## 2025-01-30 ENCOUNTER — NURSE TRIAGE (OUTPATIENT)
Dept: NURSING | Facility: CLINIC | Age: 35
End: 2025-01-30
Payer: MEDICAID

## 2025-01-31 NOTE — TELEPHONE ENCOUNTER
Patient has been having tailbone pain for the past two weeks. It is worse with standing. Left eye has been twitching for the last 5 days. Arm pain also on and off on the left side.   Patient is anemic due to heavy menstrual bleeding due to fibroids. Patient just got off of her cycle and is feeling tired, cold, and weak.   Currently tail bone pain is 10/10 with standing. With walking it slowly goes away. The worst is when she stands after sitting down. Patient has been treating with hot baths which feels good while she is in there but once she stands it is back. Sitting and leaning forward is comfortable. The pain will radiate down both legs and into the front by her pubic bone. Patient also having urinary incontinence this past week.   Denies injury.   Protocol recommends ED now  Patient care advice given.   Patient will present to [facility] or [facility].   Swapna Arteaga RN   01/30/25 10:03 PM  [facility] Nurse Advisor            Reason for Disposition   [1] Loss of bladder or bowel control (urine or bowel incontinence; wetting self, leaking stool) AND [2] new-onset    Additional Information   Negative: Passed out (i.e., lost consciousness, collapsed and was not responding)   Negative: Shock suspected (e.g., cold/pale/clammy skin, too weak to stand, low BP, rapid pulse)   Negative: Sounds like a life-threatening emergency to the triager   Negative: [1] SEVERE back pain (e.g., excruciating) AND [2] sudden onset AND [3] age > 60 years   Negative: [1] Unable to urinate (or only a few drops) > 4 hours AND [2] bladder feels very full (e.g., palpable bladder or strong urge to urinate)    Protocols used: Back Pain-A-AH

## 2025-02-07 ENCOUNTER — OFFICE VISIT (OUTPATIENT)
Dept: FAMILY MEDICINE | Facility: CLINIC | Age: 35
End: 2025-02-07
Payer: COMMERCIAL

## 2025-02-07 ENCOUNTER — ANCILLARY PROCEDURE (OUTPATIENT)
Dept: GENERAL RADIOLOGY | Facility: CLINIC | Age: 35
End: 2025-02-07
Attending: FAMILY MEDICINE
Payer: COMMERCIAL

## 2025-02-07 VITALS
DIASTOLIC BLOOD PRESSURE: 83 MMHG | BODY MASS INDEX: 24.27 KG/M2 | HEIGHT: 66 IN | OXYGEN SATURATION: 99 % | RESPIRATION RATE: 15 BRPM | HEART RATE: 95 BPM | SYSTOLIC BLOOD PRESSURE: 120 MMHG | WEIGHT: 151 LBS | TEMPERATURE: 97.5 F

## 2025-02-07 DIAGNOSIS — Z11.3 SCREEN FOR STD (SEXUALLY TRANSMITTED DISEASE): ICD-10-CM

## 2025-02-07 DIAGNOSIS — F43.10 PTSD (POST-TRAUMATIC STRESS DISORDER): ICD-10-CM

## 2025-02-07 DIAGNOSIS — Z00.00 ROUTINE GENERAL MEDICAL EXAMINATION AT A HEALTH CARE FACILITY: Primary | ICD-10-CM

## 2025-02-07 DIAGNOSIS — N63.22 MASS OF UPPER INNER QUADRANT OF LEFT BREAST: ICD-10-CM

## 2025-02-07 DIAGNOSIS — M54.42 CHRONIC BILATERAL LOW BACK PAIN WITH BILATERAL SCIATICA: ICD-10-CM

## 2025-02-07 DIAGNOSIS — M54.41 CHRONIC BILATERAL LOW BACK PAIN WITH BILATERAL SCIATICA: ICD-10-CM

## 2025-02-07 DIAGNOSIS — M25.50 ARTHRALGIA, UNSPECIFIED JOINT: ICD-10-CM

## 2025-02-07 DIAGNOSIS — N94.6 DYSMENORRHEA: ICD-10-CM

## 2025-02-07 DIAGNOSIS — G89.29 CHRONIC BILATERAL LOW BACK PAIN WITH BILATERAL SCIATICA: ICD-10-CM

## 2025-02-07 DIAGNOSIS — Z13.1 SCREENING FOR DIABETES MELLITUS: ICD-10-CM

## 2025-02-07 DIAGNOSIS — N76.0 BACTERIAL VAGINOSIS: ICD-10-CM

## 2025-02-07 DIAGNOSIS — F51.5 NIGHTMARES: ICD-10-CM

## 2025-02-07 DIAGNOSIS — N92.0 MENORRHAGIA WITH REGULAR CYCLE: ICD-10-CM

## 2025-02-07 DIAGNOSIS — N64.52 BLOODY DISCHARGE FROM LEFT NIPPLE: ICD-10-CM

## 2025-02-07 DIAGNOSIS — B96.89 BACTERIAL VAGINOSIS: ICD-10-CM

## 2025-02-07 DIAGNOSIS — N76.0 VAGINITIS AND VULVOVAGINITIS: ICD-10-CM

## 2025-02-07 LAB
CLUE CELLS: PRESENT
TRICHOMONAS, WET PREP: ABNORMAL
WBC'S/HIGH POWER FIELD, WET PREP: ABNORMAL
YEAST, WET PREP: ABNORMAL

## 2025-02-07 PROCEDURE — 87210 SMEAR WET MOUNT SALINE/INK: CPT | Performed by: FAMILY MEDICINE

## 2025-02-07 PROCEDURE — 99395 PREV VISIT EST AGE 18-39: CPT | Performed by: FAMILY MEDICINE

## 2025-02-07 PROCEDURE — 99214 OFFICE O/P EST MOD 30 MIN: CPT | Mod: 25 | Performed by: FAMILY MEDICINE

## 2025-02-07 PROCEDURE — 72100 X-RAY EXAM L-S SPINE 2/3 VWS: CPT | Mod: TC | Performed by: PREVENTIVE MEDICINE

## 2025-02-07 PROCEDURE — G2211 COMPLEX E/M VISIT ADD ON: HCPCS | Performed by: FAMILY MEDICINE

## 2025-02-07 RX ORDER — PRAZOSIN HYDROCHLORIDE 1 MG/1
2 CAPSULE ORAL AT BEDTIME
Qty: 30 CAPSULE | Refills: 1 | Status: SHIPPED | OUTPATIENT
Start: 2025-02-07

## 2025-02-07 RX ORDER — METRONIDAZOLE 500 MG/1
500 TABLET ORAL 2 TIMES DAILY
Qty: 14 TABLET | Refills: 0 | Status: SHIPPED | OUTPATIENT
Start: 2025-02-07 | End: 2025-02-14

## 2025-02-07 SDOH — HEALTH STABILITY: PHYSICAL HEALTH: ON AVERAGE, HOW MANY MINUTES DO YOU ENGAGE IN EXERCISE AT THIS LEVEL?: 10 MIN

## 2025-02-07 SDOH — HEALTH STABILITY: PHYSICAL HEALTH: ON AVERAGE, HOW MANY DAYS PER WEEK DO YOU ENGAGE IN MODERATE TO STRENUOUS EXERCISE (LIKE A BRISK WALK)?: 1 DAY

## 2025-02-07 ASSESSMENT — PAIN SCALES - GENERAL: PAINLEVEL_OUTOF10: NO PAIN (0)

## 2025-02-07 ASSESSMENT — SOCIAL DETERMINANTS OF HEALTH (SDOH): HOW OFTEN DO YOU GET TOGETHER WITH FRIENDS OR RELATIVES?: NEVER

## 2025-02-07 NOTE — PATIENT INSTRUCTIONS
Patient Education   Preventive Care Advice   This is general advice given by our system to help you stay healthy. However, your care team may have specific advice just for you. Please talk to your care team about your preventive care needs.  Nutrition  Eat 5 or more servings of fruits and vegetables each day.  Try wheat bread, brown rice and whole grain pasta (instead of white bread, rice, and pasta).  Get enough calcium and vitamin D. Check the label on foods and aim for 100% of the RDA (recommended daily allowance).  Lifestyle  Exercise at least 150 minutes each week  (30 minutes a day, 5 days a week).  Do muscle strengthening activities 2 days a week. These help control your weight and prevent disease.  No smoking.  Wear sunscreen to prevent skin cancer.  Have a dental exam and cleaning every 6 months.  Yearly exams  See your health care team every year to talk about:  Any changes in your health.  Any medicines your care team has prescribed.  Preventive care, family planning, and ways to prevent chronic diseases.  Shots (vaccines)   HPV shots (up to age 26), if you've never had them before.  Hepatitis B shots (up to age 59), if you've never had them before.  COVID-19 shot: Get this shot when it's due.  Flu shot: Get a flu shot every year.  Tetanus shot: Get a tetanus shot every 10 years.  Pneumococcal, hepatitis A, and RSV shots: Ask your care team if you need these based on your risk.  Shingles shot (for age 50 and up)  General health tests  Diabetes screening:  Starting at age 35, Get screened for diabetes at least every 3 years.  If you are younger than age 35, ask your care team if you should be screened for diabetes.  Cholesterol test: At age 39, start having a cholesterol test every 5 years, or more often if advised.  Bone density scan (DEXA): At age 50, ask your care team if you should have this scan for osteoporosis (brittle bones).  Hepatitis C: Get tested at least once in your life.  STIs (sexually  transmitted infections)  Before age 24: Ask your care team if you should be screened for STIs.  After age 24: Get screened for STIs if you're at risk. You are at risk for STIs (including HIV) if:  You are sexually active with more than one person.  You don't use condoms every time.  You or a partner was diagnosed with a sexually transmitted infection.  If you are at risk for HIV, ask about PrEP medicine to prevent HIV.  Get tested for HIV at least once in your life, whether you are at risk for HIV or not.  Cancer screening tests  Cervical cancer screening: If you have a cervix, begin getting regular cervical cancer screening tests starting at age 21.  Breast cancer scan (mammogram): If you've ever had breasts, begin having regular mammograms starting at age 40. This is a scan to check for breast cancer.  Colon cancer screening: It is important to start screening for colon cancer at age 45.  Have a colonoscopy test every 10 years (or more often if you're at risk) Or, ask your provider about stool tests like a FIT test every year or Cologuard test every 3 years.  To learn more about your testing options, visit:   .  For help making a decision, visit:   https://bit.ly/ka59854.  Prostate cancer screening test: If you have a prostate, ask your care team if a prostate cancer screening test (PSA) at age 55 is right for you.  Lung cancer screening: If you are a current or former smoker ages 50 to 80, ask your care team if ongoing lung cancer screenings are right for you.  For informational purposes only. Not to replace the advice of your health care provider. Copyright   2023 [facility]. All rights reserved. Clinically reviewed by the [facility] Transitions Program. [employer] 747936 - REV 01/24.  Learning About Stress  What is stress?     Stress is your body's response to a hard situation. Your body can have a physical, emotional, or mental response. Stress is a fact of life for most people, and it  affects everyone differently. What causes stress for you may not be stressful for someone else.  A lot of things can cause stress. You may feel stress when you go on a job interview, take a test, or run a race. This kind of short-term stress is normal and even useful. It can help you if you need to work hard or react quickly. For example, stress can help you finish an important job on time.  Long-term stress is caused by ongoing stressful situations or events. Examples of long-term stress include long-term health problems, ongoing problems at work, or conflicts in your family. Long-term stress can harm your health.  How does stress affect your health?  When you are stressed, your body responds as though you are in danger. It makes hormones that speed up your heart, make you breathe faster, and give you a burst of energy. This is called the fight-or-flight stress response. If the stress is over quickly, your body goes back to normal and no harm is done.  But if stress happens too often or lasts too long, it can have bad effects. Long-term stress can make you more likely to get sick, and it can make symptoms of some diseases worse. If you tense up when you are stressed, you may develop neck, shoulder, or low back pain. Stress is linked to high blood pressure and heart disease.  Stress also harms your emotional health. It can make you rousseau, tense, or depressed. Your relationships may suffer, and you may not do well at work or school.  What can you do to manage stress?  You can try these things to help manage stress:   Do something active. Exercise or activity can help reduce stress. Walking is a great way to get started. Even everyday activities such as housecleaning or yard work can help.  Try yoga or nelly chi. These techniques combine exercise and meditation. You may need some training at first to learn them.  Do something you enjoy. For example, listen to music or go to a movie. Practice your hobby or do volunteer  "work.  Meditate. This can help you relax, because you are not worrying about what happened before or what may happen in the future.  Do guided imagery. Imagine yourself in any setting that helps you feel calm. You can use online videos, books, or a teacher to guide you.  Do breathing exercises. For example:  From a standing position, bend forward from the waist with your knees slightly bent. Let your arms dangle close to the floor.  Breathe in slowly and deeply as you return to a standing position. Roll up slowly and lift your head last.  Hold your breath for just a few seconds in the standing position.  Breathe out slowly and bend forward from the waist.  Let your feelings out. Talk, laugh, cry, and express anger when you need to. Talking with supportive friends or family, a counselor, or a sai leader about your feelings is a healthy way to relieve stress. Avoid discussing your feelings with people who make you feel worse.  Write. It may help to write about things that are bothering you. This helps you find out how much stress you feel and what is causing it. When you know this, you can find better ways to cope.  What can you do to prevent stress?  You might try some of these things to help prevent stress:  Manage your time. This helps you find time to do the things you want and need to do.  Get enough sleep. Your body recovers from the stresses of the day while you are sleeping.  Get support. Your family, friends, and community can make a difference in how you experience stress.  Limit your news feed. Avoid or limit time on social media or news that may make you feel stressed.  Do something active. Exercise or activity can help reduce stress. Walking is a great way to get started.  Where can you learn more?  Go to https://www.[employer].net/patiented  Enter N032 in the search box to learn more about \"Learning About Stress.\"  Current as of: October 24, 2023  Content Version: 14.3    2024 [employer]. "   Care instructions adapted under license by your healthcare professional. If you have questions about a medical condition or this instruction, always ask your healthcare professional. [employer] disclaims any warranty or liability for your use of this information.

## 2025-02-25 ENCOUNTER — HOSPITAL ENCOUNTER (EMERGENCY)
Facility: CLINIC | Age: 35
Discharge: HOME | End: 2025-02-25
Attending: EMERGENCY MEDICINE | Admitting: EMERGENCY MEDICINE
Payer: COMMERCIAL

## 2025-02-25 ENCOUNTER — APPOINTMENT (OUTPATIENT)
Dept: ULTRASOUND IMAGING | Facility: CLINIC | Age: 35
End: 2025-02-25
Attending: EMERGENCY MEDICINE
Payer: COMMERCIAL

## 2025-02-25 VITALS
WEIGHT: 153 LBS | DIASTOLIC BLOOD PRESSURE: 74 MMHG | TEMPERATURE: 98 F | BODY MASS INDEX: 24.01 KG/M2 | OXYGEN SATURATION: 99 % | SYSTOLIC BLOOD PRESSURE: 126 MMHG | RESPIRATION RATE: 20 BRPM | HEIGHT: 67 IN | HEART RATE: 90 BPM

## 2025-02-25 DIAGNOSIS — N93.9 VAGINAL BLEEDING: ICD-10-CM

## 2025-02-25 LAB
ABO + RH BLD: NORMAL
ALBUMIN SERPL BCG-MCNC: 4.3 G/DL (ref 3.5–5.2)
ALBUMIN UR-MCNC: 100 MG/DL
ALP SERPL-CCNC: 59 U/L (ref 40–150)
ALT SERPL W P-5'-P-CCNC: 13 U/L (ref 0–50)
ANION GAP SERPL CALCULATED.3IONS-SCNC: 9 MMOL/L (ref 7–15)
APPEARANCE UR: ABNORMAL
AST SERPL W P-5'-P-CCNC: 23 U/L (ref 0–45)
ATRIAL RATE - MUSE: 80 BPM
BILIRUB SERPL-MCNC: <0.2 MG/DL
BILIRUB UR QL STRIP: NEGATIVE
BLD GP AB SCN SERPL QL: NEGATIVE
BUN SERPL-MCNC: 7.3 MG/DL (ref 6–20)
CALCIUM SERPL-MCNC: 9.5 MG/DL (ref 8.8–10.4)
CHLORIDE SERPL-SCNC: 104 MMOL/L (ref 98–107)
COLOR UR AUTO: ABNORMAL
CREAT SERPL-MCNC: 0.61 MG/DL (ref 0.51–0.95)
DIASTOLIC BLOOD PRESSURE - MUSE: NORMAL MMHG
EGFRCR SERPLBLD CKD-EPI 2021: >90 ML/MIN/1.73M2
ERYTHROCYTE [DISTWIDTH] IN BLOOD BY AUTOMATED COUNT: 17.3 % (ref 10–15)
GLUCOSE SERPL-MCNC: 100 MG/DL (ref 70–99)
GLUCOSE UR STRIP-MCNC: NEGATIVE MG/DL
HCG SERPL QL: NEGATIVE
HCO3 SERPL-SCNC: 27 MMOL/L (ref 22–29)
HCT VFR BLD AUTO: 27.2 % (ref 35–47)
HGB BLD-MCNC: 8.4 G/DL (ref 11.7–15.7)
HGB UR QL STRIP: ABNORMAL
HOLD SPECIMEN: NORMAL
INTERPRETATION ECG - MUSE: NORMAL
KETONES UR STRIP-MCNC: NEGATIVE MG/DL
LEUKOCYTE ESTERASE UR QL STRIP: ABNORMAL
MCH RBC QN AUTO: 21.9 PG (ref 26.5–33)
MCHC RBC AUTO-ENTMCNC: 30.9 G/DL (ref 31.5–36.5)
MCV RBC AUTO: 71 FL (ref 78–100)
MUCOUS THREADS #/AREA URNS LPF: PRESENT /LPF
NITRATE UR QL: NEGATIVE
P AXIS - MUSE: 62 DEGREES
PH UR STRIP: 6.5 [PH] (ref 5–7)
PLATELET # BLD AUTO: 338 10E3/UL (ref 150–450)
POTASSIUM SERPL-SCNC: 4 MMOL/L (ref 3.4–5.3)
PR INTERVAL - MUSE: 132 MS
PROT SERPL-MCNC: 7.3 G/DL (ref 6.4–8.3)
QRS DURATION - MUSE: 88 MS
QT - MUSE: 370 MS
QTC - MUSE: 426 MS
R AXIS - MUSE: 67 DEGREES
RBC # BLD AUTO: 3.84 10E6/UL (ref 3.8–5.2)
RBC URINE: >182 /HPF
SODIUM SERPL-SCNC: 140 MMOL/L (ref 135–145)
SP GR UR STRIP: 1.03 (ref 1–1.03)
SPECIMEN EXP DATE BLD: NORMAL
SQUAMOUS EPITHELIAL: 6 /HPF
SYSTOLIC BLOOD PRESSURE - MUSE: NORMAL MMHG
T AXIS - MUSE: 65 DEGREES
UROBILINOGEN UR STRIP-MCNC: <2 MG/DL
VENTRICULAR RATE- MUSE: 80 BPM
WBC # BLD AUTO: 5.2 10E3/UL (ref 4–11)
WBC URINE: 23 /HPF

## 2025-02-25 PROCEDURE — 80053 COMPREHEN METABOLIC PANEL: CPT | Performed by: EMERGENCY MEDICINE

## 2025-02-25 PROCEDURE — 99285 EMERGENCY DEPT VISIT HI MDM: CPT | Mod: 25

## 2025-02-25 PROCEDURE — 81003 URINALYSIS AUTO W/O SCOPE: CPT | Performed by: EMERGENCY MEDICINE

## 2025-02-25 PROCEDURE — 86850 RBC ANTIBODY SCREEN: CPT | Performed by: EMERGENCY MEDICINE

## 2025-02-25 PROCEDURE — 86900 BLOOD TYPING SEROLOGIC ABO: CPT | Performed by: EMERGENCY MEDICINE

## 2025-02-25 PROCEDURE — 93005 ELECTROCARDIOGRAM TRACING: CPT | Performed by: EMERGENCY MEDICINE

## 2025-02-25 PROCEDURE — 87086 URINE CULTURE/COLONY COUNT: CPT | Performed by: EMERGENCY MEDICINE

## 2025-02-25 PROCEDURE — 84703 CHORIONIC GONADOTROPIN ASSAY: CPT | Performed by: FAMILY MEDICINE

## 2025-02-25 PROCEDURE — 85027 COMPLETE CBC AUTOMATED: CPT | Performed by: FAMILY MEDICINE

## 2025-02-25 PROCEDURE — 36415 COLL VENOUS BLD VENIPUNCTURE: CPT | Performed by: EMERGENCY MEDICINE

## 2025-02-25 PROCEDURE — 93976 VASCULAR STUDY: CPT

## 2025-02-25 ASSESSMENT — COLUMBIA-SUICIDE SEVERITY RATING SCALE - C-SSRS
2. HAVE YOU ACTUALLY HAD ANY THOUGHTS OF KILLING YOURSELF IN THE PAST MONTH?: NO
6. HAVE YOU EVER DONE ANYTHING, STARTED TO DO ANYTHING, OR PREPARED TO DO ANYTHING TO END YOUR LIFE?: NO
1. IN THE PAST MONTH, HAVE YOU WISHED YOU WERE DEAD OR WISHED YOU COULD GO TO SLEEP AND NOT WAKE UP?: NO

## 2025-02-25 NOTE — ED PROVIDER NOTES
EMERGENCY DEPARTMENT ENCOUNTER      NAME: Sarah Ann  AGE: 34 year old female  YOB: 1990  MRN: [id number]  EVALUATION DATE & TIME: No admission date for patient encounter.    PCP: Marilu France    ED PROVIDER: Tien Huber M.D.      Chief Complaint   Patient presents with    Vaginal Bleeding    Dizziness    Generalized Weakness         FINAL IMPRESSION:  1. Vaginal bleeding          ED COURSE & MEDICAL DECISION MAKIN:42 PM I met with the patient, obtained history, performed an initial exam, and discussed options and plan for diagnostics and treatment here in the ED.  4:46 PM I spoke with Dr. Adhikari, OB/GYN.  4:55 PM I spoke with the charge RN regarding placing the patient in a room in order to perform a pelvic exam.  5:20 PM I spoke with the patient and discussed option of performing a pelvic exam.  5:38 PM The patient will be discharge to home.    Pertinent Labs & Imaging studies reviewed. (See chart for details)  34 year old female presents to the Emergency Department for evaluation of vaginal bleeding. Patient appears non toxic with stable vitals signs, patient afebrile with no tachycardia or hypoxia, no increased work of breathing.  Lungs are clear and abdomen is benign.  Per review of the medical record, did review office visit through [facility] on 2025, patient noted to have history of bacterial vaginosis and recently finished a course of Flagyl, also noted to have history of dysmenorrhea looks as though plan at that time was to perform autoimmune screen and have her follow-up with OB/GYN, discussed hysteroscopy, patient reports known history of fibroids.  Certainly considered anemia, less likely ectopic or miscarriage, certainly considered progression of her fibroids, less likely new mass, less likely tubo-ovarian abscess or ovarian cyst or torsion, no discharge suggest STI or PID.  Considered CT imaging the abdomen pelvis however she has no  focal tenderness, feel she would benefit more so from screening labs and ultrasound imaging, patient also endorsed some urinary urgency and we will obtain urinalysis.    Reassessment: Labs by my independent or potation showed no signs of anemia requiring transfusion with a hemoglobin of 8.4, pregnancy negative, no signs of liver failure with LFTs 1323, no signs of thrombocytopenia with platelet count of 338.  Urine showed blood likely from the vaginal bleeding, small amount of leukocyte esterase but no nitrates, will send for culture and treat only if positive.  Urine culture pending at this time.  Ultrasound imaging returned and reported stable intramural fibroid, submucosal fibroid slightly changed in position from prior.  Discussed patient case with OB/GYN who agreed no indication for emergent intervention at this time, recommended pelvic exam to ensure small quantity of bleeding and if so could be discharged with birth control and close follow-up with the OB/GYN clinic in the next 1 or 2 days.  Repeat exam of the patient was benign and I discussed findings and OB/GYN recommendations with the patient, unfortunately due to boarding crisis there was no available exam rooms, offered to move a patient out so we can perform pelvic exam privately, had a long discussion with the patient regarding this and after discussion patient knows risk and benefits, has capacity, she defers pelvic exam at this time, she was able to easily ambulate here without report of needed frequent dressing changes.  I felt this was reasonable given respecting patient privacy and desired, reassuring exam and vitals.  Therefore, will discharge patient with prescription of ortho-novum and recommend close follow-up with the [facility] clinic in the next 1 or 2 days for continued outpatient management evaluation.  Did provide referral and contact information and recommended patient call later today.  Discussed again all these findings  recommendations the patient felt reassured and comfortable discharge.  Return precaution provided.    Medical Decision Making  Obtained supplemental history:Supplemental history obtained?: Documented in chart  Reviewed external records: External records reviewed?: Prior Imaging[facility] and Other: Documented in chart, if applicable  Care impacted by chronic illness:Documented in Chart  Did you consider but not order tests?: Work up considered but not performed and documented in chart, if applicable  Did you interpret images independently?: Independent interpretation of ECG and images noted in documentation, when applicable.  Consultation discussion with other provider:Did you involve another provider (consultant, , pharmacy, etc.)?: I discussed the care with another health care provider, see documentation for details.  Discharge. I prescribed additional prescription strength medication(s) as charted. See documentation for any additional details.    MIPS (CTPE, Dental pain, Alejandra, Sinusitis, Asthma/COPD, Head Trauma): Not Applicable        At the conclusion of the encounter I discussed the results of all of the tests and the disposition. The questions were answered and return precautions provided. The patient or family acknowledged understanding and was agreeable with the care plan.         MEDICATIONS GIVEN IN THE EMERGENCY:  Medications - No data to display    NEW PRESCRIPTIONS STARTED AT TODAY'S ER VISIT  Discharge Medication List as of 2/25/2025  5:29 PM        START taking these medications    Details   norethindrone-ethinyl estradiol (ORTHO-NOVUM) 1-35 MG-MCG tablet Take 1 tablet by mouth daily., Disp-25 tablet, R-0, Local PrintTake 2 tabs daily for 3 days and then switch to 1 tab daily.                  =================================================================    HPI    Patient information was obtained from: Patient    Use of Intrepreter:  "N/A        Sarah Ann is a 34 year old female with PMHx of left ovary cyst, uterine fibroids, nabothian cyst and intramural leiomyoma of uterus, who presents with vaginal bleeding, generalized weakness, dizziness and significant tailbone pain.    Patient reports she's been experiencing heavy vaginal bleeding the past 3-4 days and notes that she has had this issues before. She endorses new urine urgency, and tailbone pain that is significant this time around. Reports that she's been feeling generally weak as well. She's been wearing a tampon, a large period pad and a period underwear and has been bleeding through all of this 1 time an hour over the course of the last 3-4 days. Notes that she has had uterine fibroids and states that her periods have been similar to this over the past 5 months. There was some discussion regarding plans to remove the fibroids, but she has not connected with her OB/GYN recently about this and has not gotten these fibroids removed yet. She denies any increased pain with this menstrual cycle. She's been experiencing some shortness of breath as well. Denies having a headache.    She had a lumbar XR done on 2/7/2025 when she presented to her primary care clinic and this XR revealed \"preserved vertebral heights and alignment, normal disc spaces and facets for age and normal extraspinal structures\", per chart review.    Mentions that she just finished a 14-day course of Flagyl to treat her bacterial vaginosis. She is mainly concerned about her generalized weakness, tailbone pain and vaginal bleeding. No other reported complaints or concerns at this time.      VITALS:  Patient Vitals for the past 24 hrs:   BP Temp Temp src Pulse Resp SpO2 Height Weight   02/25/25 1417 126/74 98  F (36.7  C) Temporal 90 20 99 % 1.689 m (5' 6.5\") 69.4 kg (153 lb)        PHYSICAL EXAM    Constitutional:  Awake, alert, in no apparent distress  HENT:  Normocephalic, Atraumatic. Bilateral external ears " normal. Oropharynx moist. Nose normal. Neck- Normal range of motion with no guarding, No midline cervical tenderness, Supple, No stridor.   Eyes:  PERRL, EOMI with no signs of entrapment, Conjunctiva normal, No discharge.   Respiratory:  Normal breath sounds, No respiratory distress, No wheezing.    Cardiovascular:  Normal heart rate, Normal rhythm, No appreciable rubs or gallops.   GI:  Soft, No tenderness, No distension, No palpable masses  Gu: No CVA tenderness  Musculoskeletal:  Intact distal pulses, No edema. Good range of motion in all major joints. No tenderness to palpation or major deformities noted.  Integument:  Warm, Dry, No erythema, No rash.   Neurologic:  Alert & oriented, Normal motor function, Normal sensory function, No focal deficits noted.   Psychiatric:  Affect normal, Judgment normal, Mood normal.     LAB:  All pertinent labs reviewed and interpreted.  Results for orders placed or performed during the hospital encounter of 02/25/25   US Pelvis Cmplt w Transvag & Doppler LmtPel Duplex Limited    Impression    IMPRESSION:    1.  There is a stable intramural fibroid posteriorly measuring approximately 1.9 cm.    2.  Submucosal fibroid slightly changed in position than the prior study now the lower uterine segment. Possibly pedunculated. This is also slightly increased in size measuring 3.1 cm. Previously 1.5 cm.    3.  If needed, MRI may be helpful to better define these fibroids.         CBC (+ platelets, no diff)   Result Value Ref Range    WBC Count 5.2 4.0 - 11.0 10e3/uL    RBC Count 3.84 3.80 - 5.20 10e6/uL    Hemoglobin 8.4 (L) 11.7 - 15.7 g/dL    Hematocrit 27.2 (L) 35.0 - 47.0 %    MCV 71 (L) 78 - 100 fL    MCH 21.9 (L) 26.5 - 33.0 pg    MCHC 30.9 (L) 31.5 - 36.5 g/dL    RDW 17.3 (H) 10.0 - 15.0 %    Platelet Count 338 150 - 450 10e3/uL   HCG qualitative Blood   Result Value Ref Range    hCG Serum Qualitative Negative Negative   Comprehensive metabolic panel   Result Value Ref Range     Sodium 140 135 - 145 mmol/L    Potassium 4.0 3.4 - 5.3 mmol/L    Carbon Dioxide (CO2) 27 22 - 29 mmol/L    Anion Gap 9 7 - 15 mmol/L    Urea Nitrogen 7.3 6.0 - 20.0 mg/dL    Creatinine 0.61 0.51 - 0.95 mg/dL    GFR Estimate >90 >60 mL/min/1.73m2    Calcium 9.5 8.8 - 10.4 mg/dL    Chloride 104 98 - 107 mmol/L    Glucose 100 (H) 70 - 99 mg/dL    Alkaline Phosphatase 59 40 - 150 U/L    AST 23 0 - 45 U/L    ALT 13 0 - 50 U/L    Protein Total 7.3 6.4 - 8.3 g/dL    Albumin 4.3 3.5 - 5.2 g/dL    Bilirubin Total <0.2 <=1.2 mg/dL   Extra Urine Collection   Result Value Ref Range    Hold Specimen JIC    UA with Microscopic reflex to Culture    Specimen: Urine, Midstream   Result Value Ref Range    Color Urine Dark Brown (A) Colorless, Straw, Light Yellow, Yellow    Appearance Urine Cloudy (A) Clear    Glucose Urine Negative Negative mg/dL    Bilirubin Urine Negative Negative    Ketones Urine Negative Negative mg/dL    Specific Gravity Urine 1.027 1.001 - 1.030    Blood Urine >1.0 mg/dL (A) Negative    pH Urine 6.5 5.0 - 7.0    Protein Albumin Urine 100 (A) Negative mg/dL    Urobilinogen Urine <2.0 <2.0 mg/dL    Nitrite Urine Negative Negative    Leukocyte Esterase Urine 75 Sascha/uL (A) Negative    Mucus Urine Present (A) None Seen /LPF    RBC Urine >182 (H) <=2 /HPF    WBC Urine 23 (H) <=5 /HPF    Squamous Epithelials Urine 6 (H) <=1 /HPF   ECG 12-LEAD WITH MUSE (LHE)   Result Value Ref Range    Systolic Blood Pressure  mmHg    Diastolic Blood Pressure  mmHg    Ventricular Rate 80 BPM    Atrial Rate 80 BPM    TX Interval 132 ms    QRS Duration 88 ms     ms    QTc 426 ms    P Axis 62 degrees    R AXIS 67 degrees    T Axis 65 degrees    Interpretation ECG       Sinus rhythm  Normal ECG  When compared with ECG of 15-Feb-2018 03:39,  No significant change was found  Confirmed by SEE ED PROVIDER NOTE FOR, ECG INTERPRETATION ([id number]),  Leonie Cowan ([id number]) on 2/25/2025 5:56:26 PM     Adult Type and Screen   Result Value Ref  Range    ABO/RH(D) B NEG     Antibody Screen Negative Negative    SPECIMEN EXPIRATION DATE [id number]        RADIOLOGY:  US Pelvis Cmplt w Transvag & Doppler LmtPel Duplex Limited   Final Result   IMPRESSION:     1.  There is a stable intramural fibroid posteriorly measuring approximately 1.9 cm.      2.  Submucosal fibroid slightly changed in position than the prior study now the lower uterine segment. Possibly pedunculated. This is also slightly increased in size measuring 3.1 cm. Previously 1.5 cm.      3.  If needed, MRI may be helpful to better define these fibroids.                      EKG:      I have independently reviewed and interpreted the EKG(s) documented above.    PROCEDURES:   N/A     [facility] Documentation:   CMS Diagnoses:       I, Zoila Costa, am serving as a scribe to document services personally performed by Tien Huber MD, based on my observation and the provider's statements to me. I, Tien Huber MD attest that Zoila Costa is acting in a scribe capacity, has observed my performance of the services and has documented them in accordance with my direction.    Tien Huber M.D.  Emergency Medicine  [facility] EMERGENCY ROOM  [address] [id number] [phone]  Dept: [phone]      Tien Huber MD  02/25/25 9855

## 2025-02-25 NOTE — ED TRIAGE NOTES
Patient presents to ED with vaginal bleeding for the past 3 days, patient has a hx of fibroids, patient reports feeling dizzy and weak and went through an 8 pack of large pads in one day in addition to tampons, having cramping and pain to tailbone.  Chrystal Bella RN.......2/25/2025 2:20 PM     Triage Assessment (Adult)       Row Name 02/25/25 1418          Triage Assessment    Airway WDL WDL        Respiratory WDL    Respiratory WDL WDL        Skin Circulation/Temperature WDL    Skin Circulation/Temperature WDL WDL        Cardiac WDL    Cardiac WDL WDL        Peripheral/Neurovascular WDL    Peripheral Neurovascular WDL WDL        Cognitive/Neuro/Behavioral WDL    Cognitive/Neuro/Behavioral WDL WDL

## 2025-02-27 LAB — BACTERIA UR CULT: NORMAL

## 2025-03-01 LAB
C TRACH DNA SPEC QL PROBE+SIG AMP: NEGATIVE
N GONORRHOEA DNA SPEC QL NAA+PROBE: NEGATIVE
SPECIMEN TYPE: NORMAL

## 2025-03-04 ENCOUNTER — HOSPITAL ENCOUNTER (OUTPATIENT)
Dept: MAMMOGRAPHY | Facility: CLINIC | Age: 35
Discharge: HOME | End: 2025-03-04
Attending: FAMILY MEDICINE
Payer: COMMERCIAL

## 2025-03-04 DIAGNOSIS — N64.52 BLOODY DISCHARGE FROM LEFT NIPPLE: ICD-10-CM

## 2025-03-04 DIAGNOSIS — N63.22 MASS OF UPPER INNER QUADRANT OF LEFT BREAST: ICD-10-CM

## 2025-03-04 PROCEDURE — 76642 ULTRASOUND BREAST LIMITED: CPT | Mod: 50

## 2025-03-04 PROCEDURE — 77062 BREAST TOMOSYNTHESIS BI: CPT

## 2025-03-06 DIAGNOSIS — R92.8 ABNORMAL ULTRASOUND OF BREAST: Primary | ICD-10-CM

## 2025-03-11 ENCOUNTER — OFFICE VISIT (OUTPATIENT)
Dept: FAMILY MEDICINE | Facility: CLINIC | Age: 35
End: 2025-03-11
Payer: COMMERCIAL

## 2025-03-11 VITALS
HEART RATE: 89 BPM | HEIGHT: 67 IN | TEMPERATURE: 97.7 F | WEIGHT: 155.9 LBS | SYSTOLIC BLOOD PRESSURE: 118 MMHG | DIASTOLIC BLOOD PRESSURE: 70 MMHG | RESPIRATION RATE: 16 BRPM | BODY MASS INDEX: 24.47 KG/M2 | OXYGEN SATURATION: 98 %

## 2025-03-11 DIAGNOSIS — F43.10 PTSD (POST-TRAUMATIC STRESS DISORDER): Primary | ICD-10-CM

## 2025-03-11 DIAGNOSIS — F51.5 NIGHTMARES: ICD-10-CM

## 2025-03-11 DIAGNOSIS — Z91.410 HISTORY OF DOMESTIC PHYSICAL ABUSE IN ADULT: ICD-10-CM

## 2025-03-11 DIAGNOSIS — F41.9 ANXIETY: ICD-10-CM

## 2025-03-11 PROCEDURE — 3074F SYST BP LT 130 MM HG: CPT | Performed by: FAMILY MEDICINE

## 2025-03-11 PROCEDURE — 99214 OFFICE O/P EST MOD 30 MIN: CPT | Performed by: FAMILY MEDICINE

## 2025-03-11 PROCEDURE — 3078F DIAST BP <80 MM HG: CPT | Performed by: FAMILY MEDICINE

## 2025-03-11 RX ORDER — NORETHINDRONE ACETATE AND ETHINYL ESTRADIOL 1MG-20(21)
1 KIT ORAL DAILY
Qty: 28 TABLET | Refills: 1 | Status: CANCELLED | OUTPATIENT
Start: 2025-03-11
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
## 2025-03-11 NOTE — PATIENT INSTRUCTIONS
If you miss one day of OCP - take two the next day    Try rapping you OCP pac around your toothpaste so you remember to take it.  Or set an alarm

## 2025-04-01 ENCOUNTER — MYC REFILL (OUTPATIENT)
Dept: OBGYN | Facility: CLINIC | Age: 35
End: 2025-04-01

## 2025-04-01 DIAGNOSIS — N92.1 MENORRHAGIA WITH IRREGULAR CYCLE: ICD-10-CM

## 2025-04-01 RX ORDER — FERROUS SULFATE 325(65) MG
325 TABLET ORAL
Qty: 90 TABLET | Refills: 3 | Status: SHIPPED | OUTPATIENT
Start: 2025-04-01

## 2025-04-27 ENCOUNTER — MYC MEDICAL ADVICE (OUTPATIENT)
Dept: FAMILY MEDICINE | Facility: CLINIC | Age: 35
End: 2025-04-27
Payer: COMMERCIAL

## 2025-04-28 ENCOUNTER — TELEPHONE (OUTPATIENT)
Dept: FAMILY MEDICINE | Facility: CLINIC | Age: 35
End: 2025-04-28
Payer: COMMERCIAL

## 2025-04-28 NOTE — TELEPHONE ENCOUNTER
General Call ASAP please call caller back would like to speak to Sarah the person she was having converstion on my chart with. She cannot message her back. Thank you      Reason for Call:  Patient called to speak to  Sarah at [place]. She said that the  took her call, and hung up on her. She would like to speak to Sarah specifically because she had been messaging to her on My chart and cannot respond to her. Can Sarah please call this patient back. (She asked for patient advocacy number). Thank you     What are your questions or concerns:  See above. Please have Sarah call her back specifically. She was told she was on the phone.Thank you    Date of last appointment with provider: N/A    Could we send this information to you in [place] or would you prefer to receive a phone call?:   Patient would prefer a phone call   Okay to leave a detailed message?: Yes at Cell number on file:    Telephone Information:   Mobile [phone]

## 2025-04-28 NOTE — TELEPHONE ENCOUNTER
Per other TC pt was not hung up on and TC let pt know via [place] to send her sons records from his [place] and not hers as well as PCP responded to patient - please see mychart encounters for further information

## 2025-04-29 ENCOUNTER — MYC MEDICAL ADVICE (OUTPATIENT)
Dept: FAMILY MEDICINE | Facility: CLINIC | Age: 35
End: 2025-04-29
Payer: COMMERCIAL

## 2025-04-29 NOTE — TELEPHONE ENCOUNTER
I forgot to document last night about 445pm on 4/28/25    Patient called in and wanted to speak to Saskia, I advised she was on the phone and that I could help her as another .  Kept insisting to talk to Saskia.  I told her that we take incoming calls and I can certainly help her.  I asked if it was related to her [employer] message she send about her child and then got upset and only wanted to talk to Saskia.  I advised again that she was on the phone.  She then stated that she wanted to to know my name and talk to the manager.  I advised that she could talk to patient relations with any concerns.  She kept getting upset and then I told her I was transferring her to patient relations and transferred call.

## 2025-04-30 NOTE — TELEPHONE ENCOUNTER
Writer attempted to reach patient per arrangements made via [employer].  A voicemail was left for the patient asking for a return call today.    Sophia NEAL  Clinic Manager  [facility]

## 2025-06-05 ENCOUNTER — TELEPHONE (OUTPATIENT)
Dept: FAMILY MEDICINE | Facility: CLINIC | Age: 35
End: 2025-06-05

## 2025-06-05 NOTE — TELEPHONE ENCOUNTER
RN was told Dr. Negro is living for an OB delivery, therefore will not be able to see patient for scheduled visit today.    Writer was told Dr. Negro will not be able to see patient, as provider will be out due to delivery. Attempted to call patient to triage based on reported symptoms on appointment message.No answer, left non-detailed voicemail with clinic call back number.    If patient returns call back, please reiterate message above and offer to transfer call to a triage nurse. If patient would like, please help  another appointment for patient as well. Thanks!    Elham Amos, BSN, RN, PHN   [facility]

## 2025-06-05 NOTE — TELEPHONE ENCOUNTER
Patient returns call. Writer relay RN/provider's message below. Writer offered to reschedule patient to see Dr. Negro, but appt was too far out so patient declined to reschedule appt. Patient decline to be transfer to be triage as well.    Lisa Lamas,   [facility]  June 5, 2025 3:03 PM

## 2025-06-24 ENCOUNTER — APPOINTMENT (OUTPATIENT)
Dept: ULTRASOUND IMAGING | Facility: CLINIC | Age: 35
End: 2025-06-24
Attending: STUDENT IN AN ORGANIZED HEALTH CARE EDUCATION/TRAINING PROGRAM
Payer: COMMERCIAL

## 2025-06-24 ENCOUNTER — HOSPITAL ENCOUNTER (EMERGENCY)
Facility: CLINIC | Age: 35
Discharge: HOME | End: 2025-06-24
Attending: STUDENT IN AN ORGANIZED HEALTH CARE EDUCATION/TRAINING PROGRAM
Payer: COMMERCIAL

## 2025-06-24 VITALS
DIASTOLIC BLOOD PRESSURE: 79 MMHG | BODY MASS INDEX: 25.07 KG/M2 | TEMPERATURE: 97.9 F | HEIGHT: 66 IN | OXYGEN SATURATION: 100 % | SYSTOLIC BLOOD PRESSURE: 141 MMHG | RESPIRATION RATE: 22 BRPM | HEART RATE: 99 BPM | WEIGHT: 156 LBS

## 2025-06-24 DIAGNOSIS — N93.9 VAGINAL BLEEDING: ICD-10-CM

## 2025-06-24 LAB
BASOPHILS # BLD AUTO: 0 10E3/UL (ref 0–0.2)
BASOPHILS NFR BLD AUTO: 1 %
EOSINOPHIL # BLD AUTO: 0.3 10E3/UL (ref 0–0.7)
EOSINOPHIL NFR BLD AUTO: 4 %
ERYTHROCYTE [DISTWIDTH] IN BLOOD BY AUTOMATED COUNT: 19.6 % (ref 10–15)
HCT VFR BLD AUTO: 34.2 % (ref 35–47)
HGB BLD-MCNC: 11 G/DL (ref 11.7–15.7)
HOLD SPECIMEN: NORMAL
IMM GRANULOCYTES # BLD: 0 10E3/UL
IMM GRANULOCYTES NFR BLD: 0 %
LYMPHOCYTES # BLD AUTO: 2.5 10E3/UL (ref 0.8–5.3)
LYMPHOCYTES NFR BLD AUTO: 30 %
MCH RBC QN AUTO: 23.4 PG (ref 26.5–33)
MCHC RBC AUTO-ENTMCNC: 32.2 G/DL (ref 31.5–36.5)
MCV RBC AUTO: 73 FL (ref 78–100)
MONOCYTES # BLD AUTO: 0.5 10E3/UL (ref 0–1.3)
MONOCYTES NFR BLD AUTO: 6 %
NEUTROPHILS # BLD AUTO: 5.1 10E3/UL (ref 1.6–8.3)
NEUTROPHILS NFR BLD AUTO: 61 %
NRBC # BLD AUTO: 0 10E3/UL
NRBC BLD AUTO-RTO: 0 /100
PLATELET # BLD AUTO: 323 10E3/UL (ref 150–450)
RBC # BLD AUTO: 4.71 10E6/UL (ref 3.8–5.2)
WBC # BLD AUTO: 8.5 10E3/UL (ref 4–11)

## 2025-06-24 PROCEDURE — 36415 COLL VENOUS BLD VENIPUNCTURE: CPT | Performed by: EMERGENCY MEDICINE

## 2025-06-24 PROCEDURE — 99284 EMERGENCY DEPT VISIT MOD MDM: CPT | Mod: 25 | Performed by: STUDENT IN AN ORGANIZED HEALTH CARE EDUCATION/TRAINING PROGRAM

## 2025-06-24 PROCEDURE — 76856 US EXAM PELVIC COMPLETE: CPT

## 2025-06-24 PROCEDURE — 85004 AUTOMATED DIFF WBC COUNT: CPT | Performed by: STUDENT IN AN ORGANIZED HEALTH CARE EDUCATION/TRAINING PROGRAM

## 2025-06-24 RX ORDER — NORETHINDRONE 5 MG/1
10 TABLET ORAL DAILY
Qty: 60 TABLET | Refills: 0 | Status: SHIPPED | OUTPATIENT
Start: 2025-06-24

## 2025-06-24 RX ORDER — NORETHINDRONE 5 MG/1
10 TABLET ORAL ONCE
Status: DISCONTINUED | OUTPATIENT
Start: 2025-06-24 | End: 2025-06-24 | Stop reason: HOSPADM

## 2025-06-24 ASSESSMENT — ACTIVITIES OF DAILY LIVING (ADL)
ADLS_ACUITY_SCORE: 41

## 2025-06-24 NOTE — ED TRIAGE NOTES
Patient arrives to the ER with c/o vaginal bleeding. Patient states that she was just discharged yesterday from the hospital for anemia d/t her fibroids. She stated that she had needed blood transfusions. She started bleeding with the presence of large clots about 4 hours ago.  She endorses abdominal pain that wraps around to her back as well as a headache. Denies shortness of breath, but does state that she feels weak with some mild lightheadedness.     Triage Assessment (Adult)       Row Name 06/24/25 0040          Triage Assessment    Airway WDL WDL        Respiratory WDL    Respiratory WDL WDL        Skin Circulation/Temperature WDL    Skin Circulation/Temperature WDL WDL        Cardiac WDL    Cardiac WDL WDL        Peripheral/Neurovascular WDL    Peripheral Neurovascular WDL WDL        Cognitive/Neuro/Behavioral WDL    Cognitive/Neuro/Behavioral WDL WDL

## 2025-06-24 NOTE — ED NOTES
Pt declined to take her medication Aygestin 10 mg PO, she stated that the medication will make her priti anxious. Right after reading about aygestin medication and side effect pt from her phone pt declined medication. Patient stated that she does not want to go home wanting to be admitted into the hospital. Doctor Lezama has done extensive teaching with the patient regarding her condition. Sarah Ann declined to last vitals. Dr. Remy did all the workup with the pt. And explained to her that she was stable to discharge home. Pt. Wanting to stay in the hospital and declining to go home. She refused to leave the hospital once MD discharge security was called to assist the patient to the lobby.

## 2025-06-24 NOTE — ED PROVIDER NOTES
EMERGENCY DEPARTMENT ENCOUNTER       ED Course & Medical Decision Making     4:00 AM Spoke with Dr Avalos OB/Gyn, reviewed case, will start on aygestin 10 mg, and strongly recommend close OB/Gyn follow up as an outpatient.     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  [facility] notes document history of heavy menses and known fibroids, though I do not see any documented pelvic exam or pelvic ultrasound performed during that hospitalization or encounter.  Patient was prescribed Megace for menorrhagia, though states that it gave her headache thus has not been taking it, which may be part of why her menorrhagia is not getting better.  Patient appears hemodynamically stable, heart rate in the 80s, blood pressure in the 140s upon arrival.  States she is changing her pad every 1-2 hours for the last 4 hours or so, passing large clots periodically.  Patient has her young son with her in the ED currently, no other adults with her during her ED visit.  Hemoglobin here in the ED actually returned at 11.0, which is up a little bit from 10.7 when she left [facility] 2 days ago which is fairly reassuring.  Limits reassuring.  Pelvic exam completed, does show a little bit of blood pooling in the vaginal vault, though no large clots or other gross abnormalities.  Pelvic ultrasound shows 4.3 cm heterogeneous area in the lower uterine segment/cervix with evidence of internal flow, suspicious for fibroid versus cervical mass, radiology recommending further evaluation with pelvic MRI.  Discussed with OB/GYN on-call, they recommended trialing her on the different oral contraceptive  (aygestin 10 mg) to help with her menorrhagia until she can be seen in the OB/GYN clinic for follow-up.  Will also provide written documentation of radiology's recommendation for pelvic MRI.  Will discharge home, return if symptoms change or worsen    Patient told RN she didn't want to leave, stated that air-conditioning at her home was not working, that being in the hot weather made her feel unwell while she was recovering from her blood transfusion, requesting to be admitted.  Patient does not have a medical need for admission, she is hemodynamically stable, hemoglobin increasing since her last blood transfusion and far above the normal transfusion threshold or admission threshold.  Pelvic exam with only trace active bleeding.  Patient got into a bit of an argument with RN about not wanting to leave, at which point RN involved security.  Spoke with patient again, discussed that she does not have a medical need for admission, I understand that her conditioning at home does not work, though that does not change the facts of her clinical case or indicate need for admission.  Patient also refused Aygestin medication on multiple occasions, see RN documentation as with if she actually ever took the medication for leaving the ED. Patient also told me that she consulted [facility] and began to dictate however she would like her chart to be documented, at which point I discussed with patient that while I can document her concerns about not wanting to go home and her air conditioning not working, she cannot dictate her own medical chart and documentation (that is my job).    Medical Decision Making  External Record(s) reviewed as documented below;  6/21/25, admission at [facility] for vaginal bleeding, acute blood loss anemia. PREGNANCY,SERUM QUALITATIVE - Normal . Documents patient was admitted with ongoing menorrhagia and symptomatic anemia, received 3 units PRBCs, initiated Megace 20 mg p.o. twice daily protocol,  bleeding slowed first 12 hours after admission, hemoglobin stabilized, iron infusion with Venofer also given.  No documents social reasons prompted earlier discharge than anticipated, the patient hemodynamically stable, was planning to follow-up with [facility] clinic in 7 to 10 days with Dr. Auguste.  Hemoglobin 7.1 on 6/21, increased to 10.7 on 6/22.  Chart documentation includes differential considered  EKGs or imaging independently interpreted my me (see Labs & Imaging and EKG sections).  DDx considered but not limited to: Acute blood loss anemia, menorrhagia,  Discussion of management with another provider: OB/Gyn  Care impacted by chronic illness: depression, uterine fibroids  Disposition considerations: Discharge. I prescribed additional prescription strength medication(s) as charted. I considered admission, but discharged patient after significant clinical improvement.    Not Applicable    None      Medications   norethindrone (AYGESTIN) tablet 10 mg (has no administration in time range)       New Prescriptions    NORETHINDRONE (AYGESTIN) 5 MG TABLET    Take 2 tablets (10 mg) by mouth daily.     Modified Medications    No medications on file       Final Impression     1. Vaginal bleeding        Chief Complaint     Chief Complaint   Patient presents with    Vaginal Bleeding     Patient arrives to the ER with c/o vaginal bleeding. Patient states that she was just discharged yesterday from the hospital for anemia d/t her fibroids. She stated that she had needed blood transfusions. She started bleeding with the presence of large clots about 4 hours ago.  She endorses abdominal pain that wraps around to her back as well as a headache. Denies shortness of breath, but does state that she feels weak with some mild lightheadedness.    HPI     Sarah Ann is a 35 year old female who presents for evaluation of vaginal bleeding and abdominal pain    Physical Exam     BP (!) 141/79   Pulse 99    "Temp 97.9  F (36.6  C)   Resp 22   Ht 1.676 m (5' 6\")   Wt 70.8 kg (156 lb)   SpO2 100%   BMI 25.18 kg/m    Constitutional: Awake, alert, in no acute distress.  Head: Normocephalic, atraumatic.  ENT: Mucous membranes moist.  Eyes: Conjunctiva normal.  Respiratory: Respirations even, unlabored, in no acute respiratory distress.  Cardiovascular: Regular rate and rhythm. Good peripheral perfusion.  GI: Abdomen soft, non-tender.  GYN (Pelvic): Small amount of blood pooling in the vaginal vault, no large clots seen.  Grossly normal appearing cervix from visualized portions. Pelvic exam chaperoned by ED Tech Heidi  Musculoskeletal: Moves all 4 extremities equally.  Integument: Warm, dry.  Neurologic: Alert & oriented x 3. Normal speech. Grossly normal motor and sensory function. No focal deficits noted.  Psychiatric: Normal mood    Labs & Imaging     Results for orders placed or performed during the hospital encounter of 06/24/25   US Pelvic Complete with Transvaginal    Impression    IMPRESSION:  1.  There is 4.3 cm heterogeneous area in the lower uterine segment/cervix with evidence of internal flow, new as compared to 4/4/2024 exam, indeterminate, could represent lower uterine/cervical fibroid versus cervical mass, recommend further evaluation   with pelvic MRI for more detailed characterization.  2.  There is 2.7 cm predominantly intramural left uterine body fibroid.  3.  There is 1.7 cm cystic area in the right ovary, could represent physiological or hemorrhagic cyst.   Extra Blue Top Tube   Result Value Ref Range    Hold Specimen JIC    Extra Red Top Tube   Result Value Ref Range    Hold Specimen JIC    Extra Green Top (Lithium Heparin) Tube   Result Value Ref Range    Hold Specimen JIC    Extra Purple Top Tube   Result Value Ref Range    Hold Specimen JIC    CBC with platelets and differential   Result Value Ref Range    WBC Count 8.5 4.0 - 11.0 10e3/uL    RBC Count 4.71 3.80 - 5.20 10e6/uL    Hemoglobin 11.0 " (L) 11.7 - 15.7 g/dL    Hematocrit 34.2 (L) 35.0 - 47.0 %    MCV 73 (L) 78 - 100 fL    MCH 23.4 (L) 26.5 - 33.0 pg    MCHC 32.2 31.5 - 36.5 g/dL    RDW 19.6 (H) 10.0 - 15.0 %    Platelet Count 323 150 - 450 10e3/uL    % Neutrophils 61 %    % Lymphocytes 30 %    % Monocytes 6 %    % Eosinophils 4 %    % Basophils 1 %    % Immature Granulocytes 0 %    NRBCs per 100 WBC 0 <1 /100    Absolute Neutrophils 5.1 1.6 - 8.3 10e3/uL    Absolute Lymphocytes 2.5 0.8 - 5.3 10e3/uL    Absolute Monocytes 0.5 0.0 - 1.3 10e3/uL    Absolute Eosinophils 0.3 0.0 - 0.7 10e3/uL    Absolute Basophils 0.0 0.0 - 0.2 10e3/uL    Absolute Immature Granulocytes 0.0 <=0.4 10e3/uL    Absolute NRBCs 0.0 10e3/uL        Bryce Remy MD  06/24/25 0935       Bryce Remy MD  06/24/25 5190       Bryce Remy MD  06/24/25 8417       Bryce Remy MD  06/24/25 2439

## 2025-06-24 NOTE — PROGRESS NOTES
Telephone consultation performed with ED doctor.  Pt case reviewed including stable vitals and improving hgb since admission at [facility] where she discharged yesterday. She stopped taking the Megace she was prescribed by them due to side effect of headache and her bleeding increased.  US today indicates some vascular structure in lower uterine segment/cervical area with MRI recommended for further characterization. US on 2/2025 showed a similar structure in JOY thought to represent possible prolapsed fibroid on a stalk.   Since she is hemodynamically stable, recommend outpt follow up and management with aygestin 5-10 mg daily until her scheduled follow up with Dr. Auguste on 7/10/25. Alternately, offered that she could be seen at our clinic prior to that if she is interested. Importance of continuing hormonal treatments revd.  Sonja Avalos MD

## 2025-06-24 NOTE — DISCHARGE INSTRUCTIONS
Your pelvic ultrasound showed a 4.3 cm mass in the lower uterine segment that could be either a cervical fibroid versus cervical mass. Radiology recommended further evaluation   with pelvic MRI for more detailed characterization.  This can be done as an outpatient, though you will need to discuss this with your OB/GYN doctor to have them order this test.    In the meantime, our OB/GYN doctors recommended you start on a different oral contraceptive medication called Aygestin. This will replace the Megace you were previously prescribed at [facility] (stop taking that medication)

## 2025-06-26 ENCOUNTER — PATIENT OUTREACH (OUTPATIENT)
Dept: CARE COORDINATION | Facility: CLINIC | Age: 35
End: 2025-06-26
Payer: COMMERCIAL

## 2025-06-26 NOTE — PROGRESS NOTES
[facility] Contact  [facility]/Voicemail     Clinical Data: Care Coordination ED-sourced Outreach-     Outreach attempted x 2.  Left message on patient's voicemail, providing [facility]'s 24/7 scheduling and nurse triage phone number [phone] ([phone]) for questions/concerns and/or to schedule an appt with an [facility] provider.      Care Coordination introduction letter with explanation of Clinic Care Coordination services sent to patient via [address] Clinic Care Coordination services remain available via referral if needed.    Plan: [facility] will do no further outreaches at this time.       Sonja Martell MA  Connected Care Resource Center, [facility]    *Connected Care Resource Team does NOT follow patient ongoing. Referrals are identified based on internal discharge reports and the outreach is to ensure patient has an understanding of their discharge instructions.

## 2025-06-26 NOTE — LETTER
Sarah Ann  [address]  [place] [address]    Dear Sarah Ann,      I am a team member within the Connected Care Resource Center with [facility]. I recently tried to reach you to ensure you were doing well following a recent visit within our health system. I also wanted to take this chance to introduce Clinic Care Coordination.     Below is a description of Clinic Care Coordination and how this team can further assist you:       The Clinic Care Coordination team is made up of a Registered Nurse, , Financial Resource Worker, and a Community Health Worker who understand and can help navigate the health care system. The goal of clinic care coordination is to help you manage your health, improve access to care, and achieve optimal health outcomes. They work alongside your provider to assist you in determining your health and social needs, obtain health care and community resources, and provide you with necessary information and education. Clinic Care Coordination can work with you through any barriers and develop a care plan that helps coordinate and strengthen the relationship between you and your care team.    If you wish to connect with the Clinic Care Coordination Team, please let your [facility] Primary Care Provider or Clinic Care Team know and they can place a referral. The Clinic Care Coordination team will then reach out by phone to further support you.    We are focused on providing you with the highest-quality healthcare experience possible.    Sincerely,   Your care team with [facility]'s [address] [place] [address] ([phone]).    Sonja Martell, [facility]  Care Coordination

## 2025-08-11 ENCOUNTER — ANCILLARY PROCEDURE (OUTPATIENT)
Dept: MAMMOGRAPHY | Facility: CLINIC | Age: 35
End: 2025-08-11
Attending: FAMILY MEDICINE
Payer: COMMERCIAL

## 2025-08-11 DIAGNOSIS — R92.8 ABNORMAL ULTRASOUND OF BREAST: ICD-10-CM

## 2025-08-11 PROCEDURE — 76642 ULTRASOUND BREAST LIMITED: CPT | Mod: LT | Performed by: RADIOLOGY

## 2025-09-02 ENCOUNTER — TELEPHONE (OUTPATIENT)
Dept: OBGYN | Facility: CLINIC | Age: 35
End: 2025-09-02